# Patient Record
Sex: MALE | Race: WHITE | NOT HISPANIC OR LATINO
[De-identification: names, ages, dates, MRNs, and addresses within clinical notes are randomized per-mention and may not be internally consistent; named-entity substitution may affect disease eponyms.]

---

## 2017-01-10 ENCOUNTER — APPOINTMENT (OUTPATIENT)
Dept: PULMONOLOGY | Facility: CLINIC | Age: 52
End: 2017-01-10

## 2017-01-10 VITALS — BODY MASS INDEX: 29.6 KG/M2 | WEIGHT: 189 LBS

## 2017-01-10 VITALS — HEART RATE: 80 BPM | DIASTOLIC BLOOD PRESSURE: 70 MMHG | OXYGEN SATURATION: 96 % | SYSTOLIC BLOOD PRESSURE: 130 MMHG

## 2017-01-10 DIAGNOSIS — J44.9 CHRONIC OBSTRUCTIVE PULMONARY DISEASE, UNSPECIFIED: ICD-10-CM

## 2017-01-10 DIAGNOSIS — G47.33 OBSTRUCTIVE SLEEP APNEA (ADULT) (PEDIATRIC): ICD-10-CM

## 2017-01-10 DIAGNOSIS — R04.2 HEMOPTYSIS: ICD-10-CM

## 2017-02-01 ENCOUNTER — RX RENEWAL (OUTPATIENT)
Age: 52
End: 2017-02-01

## 2017-02-28 ENCOUNTER — FORM ENCOUNTER (OUTPATIENT)
Age: 52
End: 2017-02-28

## 2017-03-01 ENCOUNTER — APPOINTMENT (OUTPATIENT)
Dept: CT IMAGING | Facility: CLINIC | Age: 52
End: 2017-03-01

## 2017-03-01 ENCOUNTER — OUTPATIENT (OUTPATIENT)
Dept: OUTPATIENT SERVICES | Facility: HOSPITAL | Age: 52
LOS: 1 days | End: 2017-03-01
Payer: COMMERCIAL

## 2017-03-01 DIAGNOSIS — R04.2 HEMOPTYSIS: ICD-10-CM

## 2017-03-01 PROCEDURE — 71250 CT THORAX DX C-: CPT

## 2017-03-01 PROCEDURE — 70490 CT SOFT TISSUE NECK W/O DYE: CPT

## 2017-05-10 ENCOUNTER — APPOINTMENT (OUTPATIENT)
Dept: PULMONOLOGY | Facility: CLINIC | Age: 52
End: 2017-05-10

## 2017-06-15 ENCOUNTER — APPOINTMENT (OUTPATIENT)
Dept: PULMONOLOGY | Facility: CLINIC | Age: 52
End: 2017-06-15

## 2017-07-24 ENCOUNTER — EMERGENCY (EMERGENCY)
Facility: HOSPITAL | Age: 52
LOS: 1 days | Discharge: HOME | End: 2017-07-24
Attending: EMERGENCY MEDICINE | Admitting: EMERGENCY MEDICINE
Payer: COMMERCIAL

## 2017-07-24 VITALS
WEIGHT: 179.9 LBS | HEIGHT: 68 IN | DIASTOLIC BLOOD PRESSURE: 81 MMHG | SYSTOLIC BLOOD PRESSURE: 145 MMHG | OXYGEN SATURATION: 96 % | RESPIRATION RATE: 20 BRPM | HEART RATE: 83 BPM | TEMPERATURE: 98 F

## 2017-07-24 PROCEDURE — 99283 EMERGENCY DEPT VISIT LOW MDM: CPT

## 2017-07-24 NOTE — ED STATDOCS - MEDICAL DECISION MAKING DETAILS
d/c with steroids and follow with neurologist. d/c with steroids and follow with neurologist.  Describes symptoms discretely in femoral cutaneous nerve distribution; will start steroids and f/u with neurologist

## 2017-07-24 NOTE — ED STATDOCS - MUSCULOSKELETAL, MLM
Pt has FROM of the legs. Pt was able to take off his pant leg comfortably. Spine is midline. No CVAT. 2+ dp pulses.

## 2017-07-24 NOTE — ED ADULT TRIAGE NOTE - CHIEF COMPLAINT QUOTE
Patient arrived to ED today with c/o left leg numbness from his hip to the top of his knee.  Patient states symptoms for the past couple of weeks.

## 2017-07-24 NOTE — ED STATDOCS - OBJECTIVE STATEMENT
51 y/o male smoker with hx of nephrostomy, DM, COPD (on albuterol) presents to ED c/o worsening non-radiating numbness to the upper thigh onset 2 weeks. Pt reports that initially he had numbness intermittently; he describes the numbness as pins and needles. [place] the beach, pt was standing and states that suddenly the upper half of his leg felt 'ice cold'; after some time pt walked around and he felt fine. Pt states no pain, no ecchymosis to the leg. No other numbness around the body. Denies weakness, or difficulty walking. Pt denies heavy lifting or recent fall. Denies back pain, abd pain, fever, chills, N/V/D, HA, weakness, numbness, cough, dysuria, hematuria. No further complaints at this time.

## 2017-07-24 NOTE — ED STATDOCS - NEUROLOGICAL, MLM
Strength is normal. Normal reflexes. Slightly diminished sensations right upper thigh. No gait abnormality. No weakness.

## 2017-08-01 ENCOUNTER — APPOINTMENT (OUTPATIENT)
Dept: RADIOLOGY | Facility: CLINIC | Age: 52
End: 2017-08-01
Payer: COMMERCIAL

## 2017-08-01 ENCOUNTER — OUTPATIENT (OUTPATIENT)
Dept: OUTPATIENT SERVICES | Facility: HOSPITAL | Age: 52
LOS: 1 days | End: 2017-08-01
Payer: COMMERCIAL

## 2017-08-01 DIAGNOSIS — Z00.8 ENCOUNTER FOR OTHER GENERAL EXAMINATION: ICD-10-CM

## 2017-08-01 PROCEDURE — 72110 X-RAY EXAM L-2 SPINE 4/>VWS: CPT | Mod: 26

## 2017-08-01 PROCEDURE — 72110 X-RAY EXAM L-2 SPINE 4/>VWS: CPT

## 2017-08-01 PROCEDURE — 73502 X-RAY EXAM HIP UNI 2-3 VIEWS: CPT | Mod: 26,LT

## 2017-08-01 PROCEDURE — 73502 X-RAY EXAM HIP UNI 2-3 VIEWS: CPT

## 2017-08-07 ENCOUNTER — RX RENEWAL (OUTPATIENT)
Age: 52
End: 2017-08-07

## 2017-08-16 ENCOUNTER — APPOINTMENT (OUTPATIENT)
Dept: MRI IMAGING | Facility: CLINIC | Age: 52
End: 2017-08-16

## 2017-08-18 ENCOUNTER — APPOINTMENT (OUTPATIENT)
Dept: MRI IMAGING | Facility: CLINIC | Age: 52
End: 2017-08-18
Payer: COMMERCIAL

## 2017-08-18 ENCOUNTER — OUTPATIENT (OUTPATIENT)
Dept: OUTPATIENT SERVICES | Facility: HOSPITAL | Age: 52
LOS: 1 days | End: 2017-08-18
Payer: COMMERCIAL

## 2017-08-18 DIAGNOSIS — Z00.8 ENCOUNTER FOR OTHER GENERAL EXAMINATION: ICD-10-CM

## 2017-08-18 PROCEDURE — 72148 MRI LUMBAR SPINE W/O DYE: CPT

## 2017-08-18 PROCEDURE — 72148 MRI LUMBAR SPINE W/O DYE: CPT | Mod: 26

## 2017-09-11 ENCOUNTER — APPOINTMENT (OUTPATIENT)
Dept: PULMONOLOGY | Facility: CLINIC | Age: 52
End: 2017-09-11

## 2019-01-10 ENCOUNTER — OTHER (OUTPATIENT)
Age: 54
End: 2019-01-10

## 2019-01-10 ENCOUNTER — APPOINTMENT (OUTPATIENT)
Dept: GASTROENTEROLOGY | Facility: CLINIC | Age: 54
End: 2019-01-10

## 2019-01-14 ENCOUNTER — APPOINTMENT (OUTPATIENT)
Dept: GASTROENTEROLOGY | Facility: CLINIC | Age: 54
End: 2019-01-14
Payer: COMMERCIAL

## 2019-01-14 VITALS
HEART RATE: 75 BPM | WEIGHT: 168 LBS | DIASTOLIC BLOOD PRESSURE: 80 MMHG | OXYGEN SATURATION: 96 % | TEMPERATURE: 98.4 F | BODY MASS INDEX: 26.37 KG/M2 | HEIGHT: 67 IN | SYSTOLIC BLOOD PRESSURE: 120 MMHG

## 2019-01-14 DIAGNOSIS — E11.9 TYPE 2 DIABETES MELLITUS W/OUT COMPLICATIONS: ICD-10-CM

## 2019-01-14 DIAGNOSIS — Z87.09 PERSONAL HISTORY OF OTHER DISEASES OF THE RESPIRATORY SYSTEM: ICD-10-CM

## 2019-01-14 DIAGNOSIS — R10.12 LEFT UPPER QUADRANT PAIN: ICD-10-CM

## 2019-01-14 DIAGNOSIS — R19.7 DIARRHEA, UNSPECIFIED: ICD-10-CM

## 2019-01-14 DIAGNOSIS — R10.33 PERIUMBILICAL PAIN: ICD-10-CM

## 2019-01-14 PROCEDURE — 99203 OFFICE O/P NEW LOW 30 MIN: CPT

## 2019-01-14 RX ORDER — ATORVASTATIN CALCIUM 10 MG/1
10 TABLET, FILM COATED ORAL
Refills: 0 | Status: ACTIVE | COMMUNITY

## 2019-01-14 RX ORDER — EMPAGLIFLOZIN 10 MG/1
10 TABLET, FILM COATED ORAL
Refills: 0 | Status: ACTIVE | COMMUNITY

## 2019-01-14 RX ORDER — MONTELUKAST 10 MG/1
10 TABLET, FILM COATED ORAL
Refills: 0 | Status: ACTIVE | COMMUNITY

## 2019-01-14 NOTE — REASON FOR VISIT
[Initial Evaluation] : an initial evaluation [FreeTextEntry1] : H. abdominal pain and loose bowel movements, with an elevated lipase

## 2019-01-14 NOTE — HISTORY OF PRESENT ILLNESS
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 The patient had a period of mostly left upper quadrant discomfort with diarrhea or that was nonbloody. He had bloodwork showing an elevated lipase. The pain has much subsided. He is minimal discomfort intermittently in the left upper quadrant. He is currently well. Diarrhea has resolved. He said blood showing elevated lipase as far as he knows up to 500 on most recent labs. He had ultrasound of the abdomen that was normal. No gallstones reported. And he had a noncontrast CAT scan of the abdomen and pelvis that is normal. He did not have contrast because he had a nephrectomy many years ago apparently because of a underdeveloped or abnormal kidney. As far as he knows he has normal renal function. It no fever or chills. No family history of liver or biliary abnormalities. No family history of pancreatitis. No family history of peptic ulcer disease or intestinal malignancies. He is not a colonoscopy.

## 2019-01-14 NOTE — CONSULT LETTER
[Dear  ___] : Dear  [unfilled], [Consult Letter:] : I had the pleasure of evaluating your patient, [unfilled]. [Please see my note below.] : Please see my note below. [Consult Closing:] : Thank you very much for allowing me to participate in the care of this patient.  If you have any questions, please do not hesitate to contact me. [Sincerely,] : Sincerely, [FreeTextEntry2] : Markie Hoyos MD\par [address] [facility]\[place] [address]\par  [FreeTextEntry3] : Gurwinder Varner MD\par

## 2019-01-14 NOTE — PHYSICAL EXAM
[General Appearance - Alert] : alert [General Appearance - In No Acute Distress] : in no acute distress [FreeTextEntry1] : Heavyset pleasant gentleman, in no acute distress [Sclera] : the sclera and conjunctiva were normal [Neck Appearance] : the appearance of the neck was normal [Neck Cervical Mass (___cm)] : no neck mass was observed [Jugular Venous Distention Increased] : there was no jugular-venous distention [Respiration, Rhythm And Depth] : normal respiratory rhythm and effort [Exaggerated Use Of Accessory Muscles For Inspiration] : no accessory muscle use [Auscultation Breath Sounds / Voice Sounds] : lungs were clear to auscultation bilaterally [Apical Impulse] : the apical impulse was normal [Heart Rate And Rhythm] : heart rate was normal and rhythm regular [Full Pulse] : the pedal pulses are present [Edema] : there was no peripheral edema [Bowel Sounds] : normal bowel sounds [Abdomen Soft] : soft [Abdomen Tenderness] : non-tender [] : no hepato-splenomegaly [Abdomen Mass (___ Cm)] : no abdominal mass palpated [Cervical Lymph Nodes Enlarged Posterior Bilaterally] : posterior cervical [Cervical Lymph Nodes Enlarged Anterior Bilaterally] : anterior cervical [Supraclavicular Lymph Nodes Enlarged Bilaterally] : supraclavicular [Axillary Lymph Nodes Enlarged Bilaterally] : axillary [Femoral Lymph Nodes Enlarged Bilaterally] : femoral [Inguinal Lymph Nodes Enlarged Bilaterally] : inguinal [No CVA Tenderness] : no ~M costovertebral angle tenderness [No Spinal Tenderness] : no spinal tenderness [No Focal Deficits] : no focal deficits [Oriented To Time, Place, And Person] : oriented to person, place, and time [Impaired Insight] : insight and judgment were intact [Affect] : the affect was normal

## 2019-01-14 NOTE — ASSESSMENT
[FreeTextEntry1] : Impression\par \par Unexplained elevated lipase as high as 500\par \par Transient abdominal discomfort, essentially gone\par \par Transient diarrhea gone\par \par History of nephrectomy, no reported renal insufficiency\par \par Suggest\par \par Blood work here today to include CBC, CMP, amylase and lipase\par \par MRI with MRCP of the abdomen with contrast, as well as renal function is essentially normal\par \par Upper endoscopy and colonoscopy\par \par Call or return anytime as needed\par \par Go to emergency room if needed\par \par Low fat diet for now\par \par Risks/benefits:\par The procedure, the risks and benefits and alternatives have been reviewed in great detail with the patient.  Risks including, but not limited to sedation such as cardiac and pulmonary compromise, the procedure itself such as bleeding requiring hospitalization, transfusion, surgery, temporary or permanent colostomy.  Perforation or puncture of the requiring hospitalization, surgery, temporary colostomy.\par It has been explained to the patient that though colonoscopy is thought to be the best screening exam for colon cancer and polyps, no screening exam can find all colon polyps or cancers.  \par The patient expresses understanding of the procedure and consents to undergoing the procedure.\par \par

## 2019-01-15 ENCOUNTER — OTHER (OUTPATIENT)
Age: 54
End: 2019-01-15

## 2019-01-15 LAB
ALBUMIN SERPL ELPH-MCNC: 4.5 G/DL
ALP BLD-CCNC: 84 U/L
ALT SERPL-CCNC: 21 U/L
AMYLASE/CREAT SERPL: 247 U/L
ANION GAP SERPL CALC-SCNC: 11 MMOL/L
AST SERPL-CCNC: 12 U/L
BASOPHILS # BLD AUTO: 0.07 K/UL
BASOPHILS NFR BLD AUTO: 0.8 %
BILIRUB SERPL-MCNC: 0.4 MG/DL
BUN SERPL-MCNC: 17 MG/DL
CALCIUM SERPL-MCNC: 10 MG/DL
CANCER AG19-9 SERPL-ACNC: 30.7 U/ML
CEA SERPL-MCNC: 3.6 NG/ML
CHLORIDE SERPL-SCNC: 100 MMOL/L
CO2 SERPL-SCNC: 28 MMOL/L
CREAT SERPL-MCNC: 0.91 MG/DL
EOSINOPHIL # BLD AUTO: 0.34 K/UL
EOSINOPHIL NFR BLD AUTO: 3.8 %
GLUCOSE SERPL-MCNC: 123 MG/DL
HCT VFR BLD CALC: 52.2 %
HGB BLD-MCNC: 17.1 G/DL
IMM GRANULOCYTES NFR BLD AUTO: 0.2 %
LPL SERPL-CCNC: 382 U/L
LYMPHOCYTES # BLD AUTO: 3.73 K/UL
LYMPHOCYTES NFR BLD AUTO: 41.9 %
MAN DIFF?: NORMAL
MCHC RBC-ENTMCNC: 29.9 PG
MCHC RBC-ENTMCNC: 32.8 GM/DL
MCV RBC AUTO: 91.3 FL
MONOCYTES # BLD AUTO: 0.75 K/UL
MONOCYTES NFR BLD AUTO: 8.4 %
NEUTROPHILS # BLD AUTO: 4 K/UL
NEUTROPHILS NFR BLD AUTO: 44.9 %
PLATELET # BLD AUTO: 203 K/UL
POTASSIUM SERPL-SCNC: 4.7 MMOL/L
PROT SERPL-MCNC: 7.3 G/DL
RBC # BLD: 5.72 M/UL
RBC # FLD: 13.6 %
SODIUM SERPL-SCNC: 139 MMOL/L
WBC # FLD AUTO: 8.91 K/UL

## 2019-01-16 ENCOUNTER — APPOINTMENT (OUTPATIENT)
Dept: GASTROENTEROLOGY | Facility: CLINIC | Age: 54
End: 2019-01-16

## 2019-01-22 ENCOUNTER — APPOINTMENT (OUTPATIENT)
Dept: MRI IMAGING | Facility: CLINIC | Age: 54
End: 2019-01-22

## 2019-01-25 ENCOUNTER — OTHER (OUTPATIENT)
Age: 54
End: 2019-01-25

## 2019-02-01 ENCOUNTER — OTHER (OUTPATIENT)
Age: 54
End: 2019-02-01

## 2019-02-01 ENCOUNTER — APPOINTMENT (OUTPATIENT)
Dept: GASTROENTEROLOGY | Facility: CLINIC | Age: 54
End: 2019-02-01

## 2019-02-19 ENCOUNTER — OTHER (OUTPATIENT)
Age: 54
End: 2019-02-19

## 2019-02-21 ENCOUNTER — OTHER (OUTPATIENT)
Age: 54
End: 2019-02-21

## 2019-02-21 ENCOUNTER — APPOINTMENT (OUTPATIENT)
Dept: GASTROENTEROLOGY | Facility: AMBULATORY MEDICAL SERVICES | Age: 54
End: 2019-02-21
Payer: COMMERCIAL

## 2019-02-21 DIAGNOSIS — K29.00 ACUTE GASTRITIS W/OUT BLEEDING: ICD-10-CM

## 2019-02-21 PROCEDURE — 45380 COLONOSCOPY AND BIOPSY: CPT

## 2019-02-21 PROCEDURE — 43239 EGD BIOPSY SINGLE/MULTIPLE: CPT

## 2019-02-27 ENCOUNTER — APPOINTMENT (OUTPATIENT)
Dept: GASTROENTEROLOGY | Facility: CLINIC | Age: 54
End: 2019-02-27

## 2019-02-28 ENCOUNTER — TRANSCRIPTION ENCOUNTER (OUTPATIENT)
Age: 54
End: 2019-02-28

## 2019-03-01 ENCOUNTER — OTHER (OUTPATIENT)
Age: 54
End: 2019-03-01

## 2019-03-07 ENCOUNTER — APPOINTMENT (OUTPATIENT)
Dept: GASTROENTEROLOGY | Facility: HOSPITAL | Age: 54
End: 2019-03-07

## 2019-03-11 ENCOUNTER — MOBILE ON CALL (OUTPATIENT)
Age: 54
End: 2019-03-11

## 2019-03-11 ENCOUNTER — OTHER (OUTPATIENT)
Age: 54
End: 2019-03-11

## 2019-05-20 ENCOUNTER — OTHER (OUTPATIENT)
Age: 54
End: 2019-05-20

## 2019-06-17 ENCOUNTER — APPOINTMENT (OUTPATIENT)
Dept: GASTROENTEROLOGY | Facility: CLINIC | Age: 54
End: 2019-06-17
Payer: COMMERCIAL

## 2019-06-17 VITALS
TEMPERATURE: 98.3 F | BODY MASS INDEX: 27.28 KG/M2 | OXYGEN SATURATION: 96 % | HEIGHT: 67 IN | HEART RATE: 81 BPM | DIASTOLIC BLOOD PRESSURE: 90 MMHG | WEIGHT: 173.8 LBS | SYSTOLIC BLOOD PRESSURE: 130 MMHG

## 2019-06-17 DIAGNOSIS — R74.8 ABNORMAL LEVELS OF OTHER SERUM ENZYMES: ICD-10-CM

## 2019-06-17 PROCEDURE — 99214 OFFICE O/P EST MOD 30 MIN: CPT

## 2019-06-17 NOTE — CONSULT LETTER
[Dear  ___] : Dear  [unfilled], [Consult Letter:] : I had the pleasure of evaluating your patient, [unfilled]. [Consult Closing:] : Thank you very much for allowing me to participate in the care of this patient.  If you have any questions, please do not hesitate to contact me. [Please see my note below.] : Please see my note below. [Sincerely,] : Sincerely, [FreeTextEntry2] : Markie Hoyos MD\par [address] [facility]\[place] [address]\par  [FreeTextEntry3] : Gurwinder Varner MD\par

## 2019-06-17 NOTE — HISTORY OF PRESENT ILLNESS
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

## 2019-06-17 NOTE — ASSESSMENT
[FreeTextEntry1] : Impression\par \par Unexplained elevated lipase\par \par Weight loss\par \par Abdominal discomfort\par \par Loose bowel movements intermittently\par \par Fairly extensive workup so far negative\par \par Endoscopic ultrasound has not been done\par \par Suggest\par \par I think it would be reasonable for the patient to have endoscopic ultrasound to further look at the pancreas and biliary system\par \par I have referred him to one of my colleagues with this procedure\par \par He will follow up with me afterwards\par \par He may call or return anytime sooner as needed

## 2019-06-17 NOTE — PHYSICAL EXAM
[General Appearance - Alert] : alert [General Appearance - In No Acute Distress] : in no acute distress [FreeTextEntry1] : Heavyset pleasant gentleman, in no acute distress [Sclera] : the sclera and conjunctiva were normal [Neck Appearance] : the appearance of the neck was normal [Jugular Venous Distention Increased] : there was no jugular-venous distention [Neck Cervical Mass (___cm)] : no neck mass was observed [Respiration, Rhythm And Depth] : normal respiratory rhythm and effort [Auscultation Breath Sounds / Voice Sounds] : lungs were clear to auscultation bilaterally [Exaggerated Use Of Accessory Muscles For Inspiration] : no accessory muscle use [Heart Rate And Rhythm] : heart rate was normal and rhythm regular [Apical Impulse] : the apical impulse was normal [Edema] : there was no peripheral edema [Full Pulse] : the pedal pulses are present [Bowel Sounds] : normal bowel sounds [Abdomen Tenderness] : non-tender [Abdomen Soft] : soft [Abdomen Mass (___ Cm)] : no abdominal mass palpated [] : no hepato-splenomegaly [Cervical Lymph Nodes Enlarged Posterior Bilaterally] : posterior cervical [Supraclavicular Lymph Nodes Enlarged Bilaterally] : supraclavicular [Cervical Lymph Nodes Enlarged Anterior Bilaterally] : anterior cervical [Femoral Lymph Nodes Enlarged Bilaterally] : femoral [Axillary Lymph Nodes Enlarged Bilaterally] : axillary [No CVA Tenderness] : no ~M costovertebral angle tenderness [Inguinal Lymph Nodes Enlarged Bilaterally] : inguinal [No Spinal Tenderness] : no spinal tenderness [No Focal Deficits] : no focal deficits [Oriented To Time, Place, And Person] : oriented to person, place, and time [Affect] : the affect was normal [Impaired Insight] : insight and judgment were intact

## 2019-06-17 NOTE — REASON FOR VISIT
[FreeTextEntry1] : Persistent elevated lipase, abdominal discomfort and loose bowel movements and weight loss that time

## 2019-06-21 ENCOUNTER — OUTPATIENT (OUTPATIENT)
Dept: OUTPATIENT SERVICES | Facility: HOSPITAL | Age: 54
LOS: 1 days | End: 2019-06-21
Payer: COMMERCIAL

## 2019-06-21 VITALS
WEIGHT: 173.06 LBS | RESPIRATION RATE: 14 BRPM | HEIGHT: 67 IN | SYSTOLIC BLOOD PRESSURE: 140 MMHG | OXYGEN SATURATION: 95 % | DIASTOLIC BLOOD PRESSURE: 88 MMHG | TEMPERATURE: 98 F | HEART RATE: 70 BPM

## 2019-06-21 DIAGNOSIS — K40.90 UNILATERAL INGUINAL HERNIA, WITHOUT OBSTRUCTION OR GANGRENE, NOT SPECIFIED AS RECURRENT: Chronic | ICD-10-CM

## 2019-06-21 DIAGNOSIS — R10.9 UNSPECIFIED ABDOMINAL PAIN: ICD-10-CM

## 2019-06-21 DIAGNOSIS — Z90.5 ACQUIRED ABSENCE OF KIDNEY: Chronic | ICD-10-CM

## 2019-06-21 DIAGNOSIS — Z98.890 OTHER SPECIFIED POSTPROCEDURAL STATES: Chronic | ICD-10-CM

## 2019-06-21 LAB
ALBUMIN SERPL ELPH-MCNC: 4.4 G/DL — SIGNIFICANT CHANGE UP (ref 3.3–5)
ALP SERPL-CCNC: 100 U/L — SIGNIFICANT CHANGE UP (ref 40–120)
ALT FLD-CCNC: 14 U/L — SIGNIFICANT CHANGE UP (ref 4–41)
ANION GAP SERPL CALC-SCNC: 11 MMO/L — SIGNIFICANT CHANGE UP (ref 7–14)
AST SERPL-CCNC: 9 U/L — SIGNIFICANT CHANGE UP (ref 4–40)
BILIRUB DIRECT SERPL-MCNC: < 0.2 MG/DL — SIGNIFICANT CHANGE UP (ref 0.1–0.2)
BILIRUB SERPL-MCNC: 0.5 MG/DL — SIGNIFICANT CHANGE UP (ref 0.2–1.2)
BUN SERPL-MCNC: 21 MG/DL — SIGNIFICANT CHANGE UP (ref 7–23)
CALCIUM SERPL-MCNC: 10.5 MG/DL — SIGNIFICANT CHANGE UP (ref 8.4–10.5)
CHLORIDE SERPL-SCNC: 97 MMOL/L — LOW (ref 98–107)
CO2 SERPL-SCNC: 30 MMOL/L — SIGNIFICANT CHANGE UP (ref 22–31)
CREAT SERPL-MCNC: 1 MG/DL — SIGNIFICANT CHANGE UP (ref 0.5–1.3)
GLUCOSE SERPL-MCNC: 172 MG/DL — HIGH (ref 70–99)
HBA1C BLD-MCNC: 7.8 % — HIGH (ref 4–5.6)
HCT VFR BLD CALC: 56.2 % — HIGH (ref 39–50)
HGB BLD-MCNC: 18.1 G/DL — HIGH (ref 13–17)
MCHC RBC-ENTMCNC: 29.3 PG — SIGNIFICANT CHANGE UP (ref 27–34)
MCHC RBC-ENTMCNC: 32.2 % — SIGNIFICANT CHANGE UP (ref 32–36)
MCV RBC AUTO: 91.1 FL — SIGNIFICANT CHANGE UP (ref 80–100)
NRBC # FLD: 0 K/UL — SIGNIFICANT CHANGE UP (ref 0–0)
PLATELET # BLD AUTO: 212 K/UL — SIGNIFICANT CHANGE UP (ref 150–400)
PMV BLD: 10.5 FL — SIGNIFICANT CHANGE UP (ref 7–13)
POTASSIUM SERPL-MCNC: 4.8 MMOL/L — SIGNIFICANT CHANGE UP (ref 3.5–5.3)
POTASSIUM SERPL-SCNC: 4.8 MMOL/L — SIGNIFICANT CHANGE UP (ref 3.5–5.3)
PROT SERPL-MCNC: 7.4 G/DL — SIGNIFICANT CHANGE UP (ref 6–8.3)
RBC # BLD: 6.17 M/UL — HIGH (ref 4.2–5.8)
RBC # FLD: 14.1 % — SIGNIFICANT CHANGE UP (ref 10.3–14.5)
SODIUM SERPL-SCNC: 138 MMOL/L — SIGNIFICANT CHANGE UP (ref 135–145)
WBC # BLD: 9.94 K/UL — SIGNIFICANT CHANGE UP (ref 3.8–10.5)
WBC # FLD AUTO: 9.94 K/UL — SIGNIFICANT CHANGE UP (ref 3.8–10.5)

## 2019-06-21 PROCEDURE — 93010 ELECTROCARDIOGRAM REPORT: CPT

## 2019-06-21 NOTE — H&P PST ADULT - HISTORY OF PRESENT ILLNESS
55y/o male scheduled for upper endoscopic US anesthesia on 6/26/2019.  Pt states, " approx 6 months ago developed abdominal pain last episode 4 months ago, not associated with food.  S/p MRI, cat scan US, endoscopy, colonoscopy all negative.  Occasional elevation in lipase.  Now having further evaluation of the pancreas."

## 2019-06-21 NOTE — H&P PST ADULT - NEGATIVE GENERAL GENITOURINARY SYMPTOMS
no hematuria/no bladder infections/normal urinary frequency/no flank pain L/no flank pain R/no dysuria/no urinary hesitancy

## 2019-06-21 NOTE — H&P PST ADULT - NSICDXPASTSURGICALHX_GEN_ALL_CORE_FT
PAST SURGICAL HISTORY:  Inguinal hernia left    S/P arthroscopy left, torn meniscus 25yrs ago    S/p nephrectomy left, congenital defect  9y/o PAST SURGICAL HISTORY:  Inguinal hernia left    S/P arthroscopy left, torn meniscus 25yrs ago    S/p nephrectomy left, congenital defect  7y/o

## 2019-06-21 NOTE — H&P PST ADULT - NSICDXPROBLEM_GEN_ALL_CORE_FT
PROBLEM DIAGNOSES  Problem: Abdominal pain  Assessment and Plan: Pt scheduled for upper endoscopic US anesthesia on 6/26/2019.  labs done results pending, ekg done.  Preop teaching done, pt able to verbalize understanding.     OR booking notified of sleep apnea    Meds day of procedure:  trilogy ellipta, montelukast, ranitidine, albuterol

## 2019-06-21 NOTE — H&P PST ADULT - RS GEN PE MLT RESP DETAILS PC
no chest wall tenderness/no intercostal retractions/no rales/no subcutaneous emphysema/no wheezes/clear to auscultation bilaterally/good air movement/respirations non-labored/no rhonchi/breath sounds equal

## 2019-06-21 NOTE — H&P PST ADULT - NSICDXPASTMEDICALHX_GEN_ALL_CORE_FT
PAST MEDICAL HISTORY:  Abdominal pain     Asthma     Diabetes mellitus type 2    GERD (gastroesophageal reflux disease)     Hyperlipidemia PAST MEDICAL HISTORY:  Abdominal pain     Asthma     Diabetes mellitus type 2    GERD (gastroesophageal reflux disease)     Hyperlipidemia     Sleep apnea

## 2019-06-21 NOTE — H&P PST ADULT - NEGATIVE GENERAL SYMPTOMS
no malaise/no chills/no sweating/no weight loss/no polyphagia/no fatigue/no weight gain/no polyuria/no polydipsia/no fever/no anorexia

## 2019-06-21 NOTE — H&P PST ADULT - GASTROINTESTINAL COMMENTS
last episode of abdominal pain 4 months ago last episode of abdominal pain 4 months ago, not associated with food, elevated lipase levels

## 2019-06-21 NOTE — H&P PST ADULT - GASTROINTESTINAL DETAILS
nontender/bowel sounds normal/no bruit/soft/no guarding/no distention/no masses palpable/no rebound tenderness/no rigidity/no organomegaly

## 2019-06-21 NOTE — H&P PST ADULT - NEGATIVE CARDIOVASCULAR SYMPTOMS
no orthopnea/no chest pain/no dyspnea on exertion/no claudication/no palpitations/no paroxysmal nocturnal dyspnea/no peripheral edema

## 2019-06-26 ENCOUNTER — APPOINTMENT (OUTPATIENT)
Dept: GASTROENTEROLOGY | Facility: HOSPITAL | Age: 54
End: 2019-06-26

## 2019-07-29 ENCOUNTER — OTHER (OUTPATIENT)
Age: 54
End: 2019-07-29

## 2019-07-29 RX ORDER — RANITIDINE 300 MG/1
300 TABLET ORAL
Qty: 30 | Refills: 2 | Status: ACTIVE | COMMUNITY
Start: 2019-02-21 | End: 1900-01-01

## 2019-08-20 PROBLEM — E11.9 TYPE 2 DIABETES MELLITUS WITHOUT COMPLICATIONS: Chronic | Status: ACTIVE | Noted: 2019-06-21

## 2019-08-20 PROBLEM — E78.5 HYPERLIPIDEMIA, UNSPECIFIED: Chronic | Status: ACTIVE | Noted: 2019-06-21

## 2019-08-20 PROBLEM — K21.9 GASTRO-ESOPHAGEAL REFLUX DISEASE WITHOUT ESOPHAGITIS: Chronic | Status: ACTIVE | Noted: 2019-06-21

## 2019-08-20 PROBLEM — J45.909 UNSPECIFIED ASTHMA, UNCOMPLICATED: Chronic | Status: ACTIVE | Noted: 2019-06-21

## 2019-09-03 ENCOUNTER — OUTPATIENT (OUTPATIENT)
Dept: OUTPATIENT SERVICES | Facility: HOSPITAL | Age: 54
LOS: 1 days | End: 2019-09-03
Payer: COMMERCIAL

## 2019-09-03 VITALS
WEIGHT: 169.98 LBS | DIASTOLIC BLOOD PRESSURE: 82 MMHG | RESPIRATION RATE: 16 BRPM | SYSTOLIC BLOOD PRESSURE: 160 MMHG | HEART RATE: 80 BPM | OXYGEN SATURATION: 95 % | HEIGHT: 66 IN | TEMPERATURE: 98 F

## 2019-09-03 DIAGNOSIS — J45.909 UNSPECIFIED ASTHMA, UNCOMPLICATED: ICD-10-CM

## 2019-09-03 DIAGNOSIS — K40.90 UNILATERAL INGUINAL HERNIA, WITHOUT OBSTRUCTION OR GANGRENE, NOT SPECIFIED AS RECURRENT: Chronic | ICD-10-CM

## 2019-09-03 DIAGNOSIS — Z90.5 ACQUIRED ABSENCE OF KIDNEY: Chronic | ICD-10-CM

## 2019-09-03 DIAGNOSIS — Z98.890 OTHER SPECIFIED POSTPROCEDURAL STATES: Chronic | ICD-10-CM

## 2019-09-03 DIAGNOSIS — G47.33 OBSTRUCTIVE SLEEP APNEA (ADULT) (PEDIATRIC): ICD-10-CM

## 2019-09-03 DIAGNOSIS — R10.9 UNSPECIFIED ABDOMINAL PAIN: ICD-10-CM

## 2019-09-03 DIAGNOSIS — Z01.818 ENCOUNTER FOR OTHER PREPROCEDURAL EXAMINATION: ICD-10-CM

## 2019-09-03 DIAGNOSIS — R74.8 ABNORMAL LEVELS OF OTHER SERUM ENZYMES: ICD-10-CM

## 2019-09-03 DIAGNOSIS — E11.9 TYPE 2 DIABETES MELLITUS WITHOUT COMPLICATIONS: ICD-10-CM

## 2019-09-03 LAB
ANION GAP SERPL CALC-SCNC: 13 MMOL/L — SIGNIFICANT CHANGE UP (ref 5–17)
BUN SERPL-MCNC: 20 MG/DL — SIGNIFICANT CHANGE UP (ref 7–23)
CALCIUM SERPL-MCNC: 10.1 MG/DL — SIGNIFICANT CHANGE UP (ref 8.4–10.5)
CHLORIDE SERPL-SCNC: 99 MMOL/L — SIGNIFICANT CHANGE UP (ref 96–108)
CO2 SERPL-SCNC: 27 MMOL/L — SIGNIFICANT CHANGE UP (ref 22–31)
CREAT SERPL-MCNC: 0.96 MG/DL — SIGNIFICANT CHANGE UP (ref 0.5–1.3)
GLUCOSE SERPL-MCNC: 194 MG/DL — HIGH (ref 70–99)
HCT VFR BLD CALC: 54.7 % — HIGH (ref 39–50)
HGB BLD-MCNC: 17.6 G/DL — HIGH (ref 13–17)
MCHC RBC-ENTMCNC: 29.4 PG — SIGNIFICANT CHANGE UP (ref 27–34)
MCHC RBC-ENTMCNC: 32.2 GM/DL — SIGNIFICANT CHANGE UP (ref 32–36)
MCV RBC AUTO: 91.5 FL — SIGNIFICANT CHANGE UP (ref 80–100)
PLATELET # BLD AUTO: 215 K/UL — SIGNIFICANT CHANGE UP (ref 150–400)
POTASSIUM SERPL-MCNC: 4.5 MMOL/L — SIGNIFICANT CHANGE UP (ref 3.5–5.3)
POTASSIUM SERPL-SCNC: 4.5 MMOL/L — SIGNIFICANT CHANGE UP (ref 3.5–5.3)
RBC # BLD: 5.98 M/UL — HIGH (ref 4.2–5.8)
RBC # FLD: 14 % — SIGNIFICANT CHANGE UP (ref 10.3–14.5)
SODIUM SERPL-SCNC: 139 MMOL/L — SIGNIFICANT CHANGE UP (ref 135–145)
WBC # BLD: 11.25 K/UL — HIGH (ref 3.8–10.5)
WBC # FLD AUTO: 11.25 K/UL — HIGH (ref 3.8–10.5)

## 2019-09-03 PROCEDURE — 85027 COMPLETE CBC AUTOMATED: CPT

## 2019-09-03 PROCEDURE — 80048 BASIC METABOLIC PNL TOTAL CA: CPT

## 2019-09-03 PROCEDURE — G0463: CPT

## 2019-09-03 PROCEDURE — 83036 HEMOGLOBIN GLYCOSYLATED A1C: CPT

## 2019-09-03 NOTE — H&P PST ADULT - NEGATIVE GENERAL SYMPTOMS
no sweating/no anorexia/no polydipsia/no polyphagia/no polyuria/no fever/no chills/no malaise/no fatigue

## 2019-09-03 NOTE — H&P PST ADULT - NSICDXPASTSURGICALHX_GEN_ALL_CORE_FT
PAST SURGICAL HISTORY:  Inguinal hernia left    S/P arthroscopy left, torn meniscus 25yrs ago    S/p nephrectomy left, congenital defect  7y/o PAST SURGICAL HISTORY:  H/O colonoscopy 6/2019    History of endoscopy 6/2019    Inguinal hernia left    S/P arthroscopy left, torn meniscus 25yrs ago    S/p nephrectomy left, congenital defect  9y/o

## 2019-09-03 NOTE — H&P PST ADULT - NSICDXPROBLEM_GEN_ALL_CORE_FT
PROBLEM DIAGNOSES  Problem: Abdominal pain  Assessment and Plan: Upper EUS ANES  Pre-op education provided - all questions answered     Problem: Asthma  Assessment and Plan: continue on current medical regimen    Problem: T2DM (type 2 diabetes mellitus)  Assessment and Plan: Finger stick on day of surgery   Hold Jardiance 3 days before surgery  Hold Metformin 24 hrs before surgery    Problem: JESUS (obstructive sleep apnea)  Assessment and Plan: JESUS precautions, OR booking notified

## 2019-09-03 NOTE — H&P PST ADULT - HISTORY OF PRESENT ILLNESS
53y/o male with H/O T2SM, GERD, JESUS on CPAP, Asthma, presents to PST for scheduled upper endoscopic US anesthesia on 9/6/2019.  Pt states, " approx 8 months ago developed abdominal pain last episode 6 months ago, not associated with food.  S/p MRI, cat scan US, endoscopy, colonoscopy all negative.  Occasional elevation in lipase.  Now having further evaluation of the pancreas."

## 2019-09-03 NOTE — H&P PST ADULT - NSICDXPASTMEDICALHX_GEN_ALL_CORE_FT
PAST MEDICAL HISTORY:  Abdominal pain     Asthma     Current smoker     Diabetes mellitus type 2    GERD (gastroesophageal reflux disease)     Hyperlipidemia     Sleep apnea CPAP

## 2019-09-03 NOTE — H&P PST ADULT - RS GEN PE MLT RESP DETAILS PC
good air movement/respirations non-labored/no rhonchi/no rales/no wheezes/breath sounds equal/clear to auscultation bilaterally

## 2019-09-03 NOTE — H&P PST ADULT - NEUROLOGICAL DETAILS
no spontaneous movement/alert and oriented x 3/sensation intact/normal strength/responds to verbal commands

## 2019-09-03 NOTE — H&P PST ADULT - GASTROINTESTINAL DETAILS
nontender/no distention/bowel sounds normal/no masses palpable/no guarding/no rigidity/no organomegaly/no rebound tenderness/soft

## 2019-09-04 LAB — HBA1C BLD-MCNC: 7.9 % — HIGH (ref 4–5.6)

## 2019-09-06 ENCOUNTER — RESULT REVIEW (OUTPATIENT)
Age: 54
End: 2019-09-06

## 2019-09-06 ENCOUNTER — OUTPATIENT (OUTPATIENT)
Dept: OUTPATIENT SERVICES | Facility: HOSPITAL | Age: 54
LOS: 1 days | End: 2019-09-06
Payer: COMMERCIAL

## 2019-09-06 ENCOUNTER — APPOINTMENT (OUTPATIENT)
Dept: GASTROENTEROLOGY | Facility: HOSPITAL | Age: 54
End: 2019-09-06

## 2019-09-06 DIAGNOSIS — R74.8 ABNORMAL LEVELS OF OTHER SERUM ENZYMES: ICD-10-CM

## 2019-09-06 DIAGNOSIS — Z98.890 OTHER SPECIFIED POSTPROCEDURAL STATES: Chronic | ICD-10-CM

## 2019-09-06 DIAGNOSIS — R10.9 UNSPECIFIED ABDOMINAL PAIN: ICD-10-CM

## 2019-09-06 DIAGNOSIS — K40.90 UNILATERAL INGUINAL HERNIA, WITHOUT OBSTRUCTION OR GANGRENE, NOT SPECIFIED AS RECURRENT: Chronic | ICD-10-CM

## 2019-09-06 DIAGNOSIS — Z90.5 ACQUIRED ABSENCE OF KIDNEY: Chronic | ICD-10-CM

## 2019-09-06 PROCEDURE — 88305 TISSUE EXAM BY PATHOLOGIST: CPT

## 2019-09-06 PROCEDURE — 88312 SPECIAL STAINS GROUP 1: CPT

## 2019-09-06 PROCEDURE — 88305 TISSUE EXAM BY PATHOLOGIST: CPT | Mod: 26

## 2019-09-06 PROCEDURE — 43259 EGD US EXAM DUODENUM/JEJUNUM: CPT | Mod: GC

## 2019-09-06 PROCEDURE — 43239 EGD BIOPSY SINGLE/MULTIPLE: CPT | Mod: GC,59

## 2019-09-06 PROCEDURE — 43239 EGD BIOPSY SINGLE/MULTIPLE: CPT

## 2019-09-06 PROCEDURE — 88312 SPECIAL STAINS GROUP 1: CPT | Mod: 26

## 2019-09-25 ENCOUNTER — OTHER (OUTPATIENT)
Age: 54
End: 2019-09-25

## 2019-09-25 ENCOUNTER — RX RENEWAL (OUTPATIENT)
Age: 54
End: 2019-09-25

## 2019-09-25 RX ORDER — FAMOTIDINE 40 MG/1
40 TABLET, FILM COATED ORAL DAILY
Qty: 90 | Refills: 1 | Status: ACTIVE | COMMUNITY
Start: 2019-09-25 | End: 1900-01-01

## 2019-10-17 ENCOUNTER — APPOINTMENT (OUTPATIENT)
Dept: GASTROENTEROLOGY | Facility: CLINIC | Age: 54
End: 2019-10-17
Payer: COMMERCIAL

## 2019-10-17 VITALS
DIASTOLIC BLOOD PRESSURE: 90 MMHG | TEMPERATURE: 97.8 F | HEART RATE: 84 BPM | HEIGHT: 67 IN | RESPIRATION RATE: 16 BRPM | OXYGEN SATURATION: 96 % | WEIGHT: 177 LBS | BODY MASS INDEX: 27.78 KG/M2 | SYSTOLIC BLOOD PRESSURE: 148 MMHG

## 2019-10-17 DIAGNOSIS — K85.90 ACUTE PANCREATITIS WITHOUT NECROSIS OR INFECTION, UNSPECIFIED: ICD-10-CM

## 2019-10-17 PROCEDURE — 99214 OFFICE O/P EST MOD 30 MIN: CPT

## 2019-10-17 PROCEDURE — 99203 OFFICE O/P NEW LOW 30 MIN: CPT

## 2019-10-27 NOTE — HISTORY OF PRESENT ILLNESS
[FreeTextEntry1] : 54 recently had EUS to better evaluate pancreas. He has had a few attacks of mild pancreatitis. There is no obvious discernible cause. The patient denies drinking excessive EtOH. EUS did not reveal any stones. He feels well now. No abdominal pain, nausea or vomiting. No back pain. Denies fever or chills. Denies steatorrhea. Patient is not overweight and weight and appetite are stable. Symptoms and episodes have started with diabetes medications but it is unclear which ones since he is taking metformin and jardiance.

## 2019-10-27 NOTE — ASSESSMENT
[FreeTextEntry1] : 54 with episodes of pain across abdomen which are associated with mild elevations in pancreatic enzymes\par Given distribution and symptoms he is having acute episodes of mild recurrent pancreatitis. MRI and EUS do not elucidate any obvious causes. he is a smoker and past drinker. There is no evidence of stones. The symptoms did start with initiation of metformin. It is feasible that the metformin is the issue and I will have him discuss changing this with his endocrinologist. \par Check IgG Abs today

## 2019-10-27 NOTE — PHYSICAL EXAM
[General Appearance - Alert] : alert [General Appearance - In No Acute Distress] : in no acute distress [General Appearance - Well Nourished] : well nourished [General Appearance - Well Developed] : well developed [Neck Appearance] : the appearance of the neck was normal [Neck Cervical Mass (___cm)] : no neck mass was observed [Jugular Venous Distention Increased] : there was no jugular-venous distention [Exaggerated Use Of Accessory Muscles For Inspiration] : no accessory muscle use [Heart Sounds] : normal S1 and S2 [Edema] : there was no peripheral edema [Bowel Sounds] : normal bowel sounds [Abdomen Soft] : soft [Abdomen Tenderness] : non-tender [Cervical Lymph Nodes Enlarged Posterior Bilaterally] : posterior cervical [Cervical Lymph Nodes Enlarged Anterior Bilaterally] : anterior cervical [No CVA Tenderness] : no ~M costovertebral angle tenderness [Nail Clubbing] : no clubbing  or cyanosis of the fingernails [] : no rash [No Focal Deficits] : no focal deficits [Oriented To Time, Place, And Person] : oriented to person, place, and time [Impaired Insight] : insight and judgment were intact [Affect] : the affect was normal

## 2020-02-25 LAB — IGG4 SER-MCNC: 17 MG/DL

## 2020-04-28 NOTE — H&P PST ADULT - PT NEEDS ASSIST
Pt cleared of COVID which is negative. MJ can take pt today. She will now go into Room 2217. RN to call report to [phone]. [facility] will not  pt at 4:00pm today. no

## 2020-05-14 PROBLEM — R10.12 LEFT UPPER QUADRANT PAIN: Status: ACTIVE | Noted: 2019-01-14

## 2020-08-07 ENCOUNTER — APPOINTMENT (OUTPATIENT)
Dept: GASTROENTEROLOGY | Facility: CLINIC | Age: 55
End: 2020-08-07
Payer: COMMERCIAL

## 2020-08-07 DIAGNOSIS — Z87.19 PERSONAL HISTORY OF OTHER DISEASES OF THE DIGESTIVE SYSTEM: ICD-10-CM

## 2020-08-07 PROCEDURE — 99214 OFFICE O/P EST MOD 30 MIN: CPT

## 2020-08-07 NOTE — HISTORY OF PRESENT ILLNESS
[de-identified] : Markie Hoyos MD\par [address]\[place] [address]\par \par Very pleasant 55-year-old gentleman who had diverticulitis, hospitalized to [facility] in March of this year.\par \par Unfortunately required surgery with diverting colostomy\par \par He is now doing very well and needs colonoscopy via colostomy and rectum prior to re-anastomosis\par \par No new medical problems\par \par He had previous elevated serum lipase, had work-up including endoscopic ultrasound without a source being found\par \par Eventually was told that it might of been his metformin that was causing this\par \par He is having no abdominal pain\par \par The colostomy is functioning well

## 2020-08-07 NOTE — PHYSICAL EXAM
[FreeTextEntry1] : Well healed scar lower mid line with left lower quadrant colostomy, well-functioning

## 2020-08-07 NOTE — ASSESSMENT
[FreeTextEntry1] : Impression,\par \par March of this year severe diverticulitis requiring diverting colostomy\par \par Patient now doing well and will be having reversal with reanastomosis and needs colonoscopy via ostomy and rectum\par \par Suggest,\par \par Colonoscopy via ostomy and rectum\par \par TriLyte\par \par Fleet enema x2 the morning of the procedure\par \par The laxative, or its risks benefits and alternatives have been thoroughly reviewed with the patient in great detail. The laxative instructions have been reviewed in great detail with the patient.\par \par Risks/benefits:\par The procedure, the risks and benefits and alternatives have been reviewed in great detail with the patient.  Risks including, but not limited to sedation such as cardiac and pulmonary compromise, the procedure itself such as bleeding requiring hospitalization, transfusion, surgery, temporary or permanent colostomy.  Perforation or puncture of the requiring hospitalization, surgery, temporary colostomy.\par It has been explained to the patient that though colonoscopy is thought to be the best screening exam for colon cancer and polyps, no screening exam can find all colon polyps or cancers.  \par The patient expresses understanding of the procedure and consents to undergoing the procedure.\par \par

## 2020-08-07 NOTE — CONSULT LETTER
[FreeTextEntry3] : Gurwinder Varner MD\par  [FreeTextEntry2] : Markie Hoyos MD\par [address] [facility]\[place] [address]\par

## 2020-08-07 NOTE — REASON FOR VISIT
[FreeTextEntry1] : Patient status post diverticulitis with colostomy in March of this year, now needs colonoscopy via ostomy and rectum.  Preanastomosis

## 2020-08-09 PROBLEM — Z87.19 HISTORY OF DIVERTICULITIS: Status: RESOLVED | Noted: 2020-08-07 | Resolved: 2020-08-09

## 2020-09-02 ENCOUNTER — APPOINTMENT (OUTPATIENT)
Dept: GASTROENTEROLOGY | Facility: CLINIC | Age: 55
End: 2020-09-02
Payer: COMMERCIAL

## 2020-09-02 VITALS
SYSTOLIC BLOOD PRESSURE: 120 MMHG | OXYGEN SATURATION: 96 % | WEIGHT: 176 LBS | HEIGHT: 67 IN | BODY MASS INDEX: 27.62 KG/M2 | DIASTOLIC BLOOD PRESSURE: 77 MMHG | HEART RATE: 56 BPM

## 2020-09-02 DIAGNOSIS — Z11.59 ENCOUNTER FOR SCREENING FOR OTHER VIRAL DISEASES: ICD-10-CM

## 2020-09-02 DIAGNOSIS — Z87.19 PERSONAL HISTORY OF OTHER DISEASES OF THE DIGESTIVE SYSTEM: ICD-10-CM

## 2020-09-02 DIAGNOSIS — Z01.818 ENCOUNTER FOR OTHER PREPROCEDURAL EXAMINATION: ICD-10-CM

## 2020-09-02 PROCEDURE — 99214 OFFICE O/P EST MOD 30 MIN: CPT

## 2020-09-02 NOTE — CONSULT LETTER
[Consult Letter:] : I had the pleasure of evaluating your patient, [unfilled]. [Dear  ___] : Dear  [unfilled], [Consult Closing:] : Thank you very much for allowing me to participate in the care of this patient.  If you have any questions, please do not hesitate to contact me. [Please see my note below.] : Please see my note below. [Sincerely,] : Sincerely, [FreeTextEntry2] : Markie Hoyos MD\par [address] [facility]\[place] [address]\par  [FreeTextEntry3] : Gurwinder Varner MD\par

## 2020-09-02 NOTE — PHYSICAL EXAM
[General Appearance - Alert] : alert [General Appearance - In No Acute Distress] : in no acute distress [Sclera] : the sclera and conjunctiva were normal [Neck Appearance] : the appearance of the neck was normal [Jugular Venous Distention Increased] : there was no jugular-venous distention [Neck Cervical Mass (___cm)] : no neck mass was observed [Exaggerated Use Of Accessory Muscles For Inspiration] : no accessory muscle use [Auscultation Breath Sounds / Voice Sounds] : lungs were clear to auscultation bilaterally [Respiration, Rhythm And Depth] : normal respiratory rhythm and effort [Heart Rate And Rhythm] : heart rate was normal and rhythm regular [Apical Impulse] : the apical impulse was normal [Edema] : there was no peripheral edema [Full Pulse] : the pedal pulses are present [Bowel Sounds] : normal bowel sounds [Abdomen Soft] : soft [Abdomen Tenderness] : non-tender [] : no hepato-splenomegaly [Abdomen Mass (___ Cm)] : no abdominal mass palpated [FreeTextEntry1] : Well healed scar lower mid line with left lower quadrant colostomy, well-functioning [Cervical Lymph Nodes Enlarged Posterior Bilaterally] : posterior cervical [Cervical Lymph Nodes Enlarged Anterior Bilaterally] : anterior cervical [Supraclavicular Lymph Nodes Enlarged Bilaterally] : supraclavicular [Inguinal Lymph Nodes Enlarged Bilaterally] : inguinal [Axillary Lymph Nodes Enlarged Bilaterally] : axillary [Femoral Lymph Nodes Enlarged Bilaterally] : femoral [No Spinal Tenderness] : no spinal tenderness [No CVA Tenderness] : no ~M costovertebral angle tenderness [No Focal Deficits] : no focal deficits [Oriented To Time, Place, And Person] : oriented to person, place, and time [Impaired Insight] : insight and judgment were intact [Affect] : the affect was normal

## 2020-09-02 NOTE — HISTORY OF PRESENT ILLNESS
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

## 2020-09-02 NOTE — ASSESSMENT
[FreeTextEntry1] : Impression,\par \par Recent isolated episode of right upper quadrant and flank discomfort\par \par Ultrasound showing gallbladder sludge without any thickening of the wall of amina-cholecystic fluid or dilated bile duct\par \par There was some fullness of the collecting system on ultrasound\par \par CAT scan reported normal-appearing gallbladder\par \par It was done without contrast and no dilated ureter was noted, he is status post left nephrectomy many years ago\par \par There may have been some herniation, ventral hernia with small bowel loops but no obstruction on the CAT scan\par \par Colostomy is working well\par \par Suggest,\par \par Low-fat diet\par \par HIDA scan to look to see if gallbladder is functioning well\par \par Keep colonoscopy appointment via colostomy and rectum\par \par TriLyte above and Fleet enema below\par \par Low-fat diet\par \par If he has recurrent pain go to hospital call neurosurgeon\par \par Call return anytime\par \par The laxative, or its risks benefits and alternatives have been thoroughly reviewed with the patient in great detail. The laxative instructions have been reviewed in great detail with the patient.\par \par Risks/benefits:\par The procedure, the risks and benefits and alternatives have been reviewed in great detail with the patient.  Risks including, but not limited to sedation such as cardiac and pulmonary compromise, the procedure itself such as bleeding requiring hospitalization, transfusion, surgery, temporary or permanent colostomy.  Perforation or puncture of the requiring hospitalization, surgery, temporary colostomy.\par It has been explained to the patient that though colonoscopy is thought to be the best screening exam for colon cancer and polyps, no screening exam can find all colon polyps or cancers.  \par The patient expresses understanding of the procedure and consents to undergoing the procedure.\par \par

## 2020-09-22 ENCOUNTER — APPOINTMENT (OUTPATIENT)
Dept: NUCLEAR MEDICINE | Facility: HOSPITAL | Age: 55
End: 2020-09-22

## 2020-09-23 ENCOUNTER — RESULT REVIEW (OUTPATIENT)
Age: 55
End: 2020-09-23

## 2020-09-23 ENCOUNTER — APPOINTMENT (OUTPATIENT)
Dept: NUCLEAR MEDICINE | Facility: CLINIC | Age: 55
End: 2020-09-23

## 2020-09-23 ENCOUNTER — OUTPATIENT (OUTPATIENT)
Dept: OUTPATIENT SERVICES | Facility: HOSPITAL | Age: 55
LOS: 1 days | End: 2020-09-23
Payer: COMMERCIAL

## 2020-09-23 DIAGNOSIS — Z90.5 ACQUIRED ABSENCE OF KIDNEY: Chronic | ICD-10-CM

## 2020-09-23 DIAGNOSIS — Z43.3 ENCOUNTER FOR ATTENTION TO COLOSTOMY: ICD-10-CM

## 2020-09-23 DIAGNOSIS — K40.90 UNILATERAL INGUINAL HERNIA, WITHOUT OBSTRUCTION OR GANGRENE, NOT SPECIFIED AS RECURRENT: Chronic | ICD-10-CM

## 2020-09-23 DIAGNOSIS — Z98.890 OTHER SPECIFIED POSTPROCEDURAL STATES: Chronic | ICD-10-CM

## 2020-09-23 PROCEDURE — 78227 HEPATOBIL SYST IMAGE W/DRUG: CPT | Mod: 26

## 2020-09-28 ENCOUNTER — APPOINTMENT (OUTPATIENT)
Dept: DISASTER EMERGENCY | Facility: CLINIC | Age: 55
End: 2020-09-28

## 2020-09-29 LAB — SARS-COV-2 N GENE NPH QL NAA+PROBE: NOT DETECTED

## 2020-10-01 ENCOUNTER — APPOINTMENT (OUTPATIENT)
Dept: GASTROENTEROLOGY | Facility: AMBULATORY MEDICAL SERVICES | Age: 55
End: 2020-10-01

## 2020-12-01 ENCOUNTER — APPOINTMENT (OUTPATIENT)
Dept: GASTROENTEROLOGY | Facility: AMBULATORY MEDICAL SERVICES | Age: 55
End: 2020-12-01

## 2020-12-01 ENCOUNTER — APPOINTMENT (OUTPATIENT)
Dept: GASTROENTEROLOGY | Facility: AMBULATORY MEDICAL SERVICES | Age: 55
End: 2020-12-01
Payer: COMMERCIAL

## 2020-12-01 PROCEDURE — 45380 COLONOSCOPY AND BIOPSY: CPT

## 2020-12-03 ENCOUNTER — NON-APPOINTMENT (OUTPATIENT)
Age: 55
End: 2020-12-03

## 2020-12-08 ENCOUNTER — NON-APPOINTMENT (OUTPATIENT)
Age: 55
End: 2020-12-08

## 2021-01-11 ENCOUNTER — APPOINTMENT (OUTPATIENT)
Dept: COLORECTAL SURGERY | Facility: CLINIC | Age: 56
End: 2021-01-11
Payer: COMMERCIAL

## 2021-01-11 VITALS
TEMPERATURE: 95.8 F | BODY MASS INDEX: 27.62 KG/M2 | HEIGHT: 67 IN | HEART RATE: 106 BPM | WEIGHT: 176 LBS | SYSTOLIC BLOOD PRESSURE: 146 MMHG | DIASTOLIC BLOOD PRESSURE: 75 MMHG | RESPIRATION RATE: 16 BRPM

## 2021-01-11 PROCEDURE — 99072 ADDL SUPL MATRL&STAF TM PHE: CPT

## 2021-01-11 PROCEDURE — 99203 OFFICE O/P NEW LOW 30 MIN: CPT

## 2021-01-11 RX ORDER — NEOMYCIN SULFATE 500 MG/1
500 TABLET ORAL
Qty: 6 | Refills: 0 | Status: ACTIVE | COMMUNITY
Start: 2021-01-11 | End: 1900-01-01

## 2021-01-11 RX ORDER — METRONIDAZOLE 500 MG/1
500 TABLET ORAL
Qty: 3 | Refills: 0 | Status: ACTIVE | COMMUNITY
Start: 2021-01-11 | End: 1900-01-01

## 2021-01-11 RX ORDER — POLYETHYLENE GLYCOL-3350 AND ELECTROLYTES 236; 6.74; 5.86; 2.97; 22.74 G/274.31G; G/274.31G; G/274.31G; G/274.31G; G/274.31G
236 POWDER, FOR SOLUTION ORAL
Qty: 1 | Refills: 0 | Status: COMPLETED | COMMUNITY
Start: 2019-01-14 | End: 2021-01-11

## 2021-01-11 RX ORDER — SODIUM SULFATE, POTASSIUM SULFATE, MAGNESIUM SULFATE 17.5; 3.13; 1.6 G/ML; G/ML; G/ML
17.5-3.13-1.6 SOLUTION, CONCENTRATE ORAL
Qty: 1 | Refills: 0 | Status: COMPLETED | COMMUNITY
Start: 2020-10-07 | End: 2021-01-11

## 2021-01-11 NOTE — PHYSICAL EXAM
[No Rash or Lesion] : No rash or lesion [Alert] : alert [Oriented to Person] : oriented to person [Oriented to Place] : oriented to place [Oriented to Time] : oriented to time [Calm] : calm [de-identified] : large midline incision, LLQ colostomy with appreciable though reducible hernia, soft, NTND [de-identified] : No apparent distress [de-identified] : Normocephalic atraumatic [de-identified] : Moving all extremities x4

## 2021-01-11 NOTE — ASSESSMENT
[FreeTextEntry1] : Mr. De Anda presents to the office for discussion of colostomy reversal after undergoing urgent Watson's procedure in March 2020 for perforated sigmoid diverticulitis.  He had a colonoscopy recently which did not reveal any polyps, masses or mucosal disease that would otherwise prohibit colostomy reversal. \par \par I also reviewed with him the three potential outcomes of colostomy reversal:\par 1) Inability to reverse the colostomy because of a frozen pelvis\par 2 ) ability to reverse the colostomy, but with an associated low pelvic anastomosis which would require a diverting ileostomy to avoid consequences of an increased chance of anastomotic leak with pelvic sepsis\par 3) reversal of the colostomy \par \par He is also aware of the need to perform an antibiotic and mechanical bowel prep. Duration of procedure, length of hospital stay, period of convalescence including limitations of activity were all reviewed.   All questions were answered to his satisfaction.  We will likely plan for February 22, 2021.\par Preoperatively, I will obtain a CT of the abdomen and pelvis to better appreciate his anatomy in anticipation of surgical planning.  He understands, and is agreeable.\par

## 2021-01-11 NOTE — HISTORY OF PRESENT ILLNESS
[FreeTextEntry1] : Mr. De Anda presents to the office for consultation. In 3/29/2020, he underwent urgent Hartmanns procedure at [facility] for severe sigmoid diverticulitis.  By report, his hospital course was complicated by intra-abdominal abscess which was percutaneously drained.  He has since undergone screening colonoscopy with Dr. Varner in December/2020 and is eager for colostomy reversal.  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

## 2021-01-14 ENCOUNTER — OUTPATIENT (OUTPATIENT)
Dept: OUTPATIENT SERVICES | Facility: HOSPITAL | Age: 56
LOS: 1 days | End: 2021-01-14
Payer: COMMERCIAL

## 2021-01-14 ENCOUNTER — APPOINTMENT (OUTPATIENT)
Dept: CT IMAGING | Facility: CLINIC | Age: 56
End: 2021-01-14
Payer: COMMERCIAL

## 2021-01-14 DIAGNOSIS — Z00.8 ENCOUNTER FOR OTHER GENERAL EXAMINATION: ICD-10-CM

## 2021-01-14 DIAGNOSIS — Z43.3 ENCOUNTER FOR ATTENTION TO COLOSTOMY: ICD-10-CM

## 2021-01-14 DIAGNOSIS — K40.90 UNILATERAL INGUINAL HERNIA, WITHOUT OBSTRUCTION OR GANGRENE, NOT SPECIFIED AS RECURRENT: Chronic | ICD-10-CM

## 2021-01-14 DIAGNOSIS — Z90.5 ACQUIRED ABSENCE OF KIDNEY: Chronic | ICD-10-CM

## 2021-01-14 DIAGNOSIS — Z98.890 OTHER SPECIFIED POSTPROCEDURAL STATES: Chronic | ICD-10-CM

## 2021-01-14 PROCEDURE — 74177 CT ABD & PELVIS W/CONTRAST: CPT | Mod: 26

## 2021-01-14 PROCEDURE — 74177 CT ABD & PELVIS W/CONTRAST: CPT

## 2021-01-19 DIAGNOSIS — D49.0 NEOPLASM OF UNSPECIFIED BEHAVIOR OF DIGESTIVE SYSTEM: ICD-10-CM

## 2021-01-19 DIAGNOSIS — R91.1 SOLITARY PULMONARY NODULE: ICD-10-CM

## 2021-01-29 DIAGNOSIS — R93.89 ABNORMAL FINDINGS ON DIAGNOSTIC IMAGING OF OTHER SPECIFIED BODY STRUCTURES: ICD-10-CM

## 2021-02-07 ENCOUNTER — OUTPATIENT (OUTPATIENT)
Dept: OUTPATIENT SERVICES | Facility: HOSPITAL | Age: 56
LOS: 1 days | End: 2021-02-07
Payer: COMMERCIAL

## 2021-02-07 ENCOUNTER — APPOINTMENT (OUTPATIENT)
Dept: MRI IMAGING | Facility: CLINIC | Age: 56
End: 2021-02-07
Payer: COMMERCIAL

## 2021-02-07 DIAGNOSIS — Z00.8 ENCOUNTER FOR OTHER GENERAL EXAMINATION: ICD-10-CM

## 2021-02-07 DIAGNOSIS — R93.89 ABNORMAL FINDINGS ON DIAGNOSTIC IMAGING OF OTHER SPECIFIED BODY STRUCTURES: ICD-10-CM

## 2021-02-07 DIAGNOSIS — Z98.890 OTHER SPECIFIED POSTPROCEDURAL STATES: Chronic | ICD-10-CM

## 2021-02-07 DIAGNOSIS — Z90.5 ACQUIRED ABSENCE OF KIDNEY: Chronic | ICD-10-CM

## 2021-02-07 DIAGNOSIS — K40.90 UNILATERAL INGUINAL HERNIA, WITHOUT OBSTRUCTION OR GANGRENE, NOT SPECIFIED AS RECURRENT: Chronic | ICD-10-CM

## 2021-02-07 PROCEDURE — 74183 MRI ABD W/O CNTR FLWD CNTR: CPT | Mod: 26

## 2021-02-07 PROCEDURE — 74183 MRI ABD W/O CNTR FLWD CNTR: CPT

## 2021-02-10 ENCOUNTER — APPOINTMENT (OUTPATIENT)
Dept: CT IMAGING | Facility: CLINIC | Age: 56
End: 2021-02-10

## 2021-02-10 ENCOUNTER — OUTPATIENT (OUTPATIENT)
Dept: OUTPATIENT SERVICES | Facility: HOSPITAL | Age: 56
LOS: 1 days | End: 2021-02-10
Payer: COMMERCIAL

## 2021-02-10 DIAGNOSIS — Z90.5 ACQUIRED ABSENCE OF KIDNEY: Chronic | ICD-10-CM

## 2021-02-10 DIAGNOSIS — Z98.890 OTHER SPECIFIED POSTPROCEDURAL STATES: Chronic | ICD-10-CM

## 2021-02-10 DIAGNOSIS — R91.1 SOLITARY PULMONARY NODULE: ICD-10-CM

## 2021-02-10 DIAGNOSIS — K40.90 UNILATERAL INGUINAL HERNIA, WITHOUT OBSTRUCTION OR GANGRENE, NOT SPECIFIED AS RECURRENT: Chronic | ICD-10-CM

## 2021-02-10 PROCEDURE — 71250 CT THORAX DX C-: CPT

## 2021-02-10 PROCEDURE — 71250 CT THORAX DX C-: CPT | Mod: 26

## 2021-02-11 ENCOUNTER — NON-APPOINTMENT (OUTPATIENT)
Age: 56
End: 2021-02-11

## 2021-02-11 ENCOUNTER — OUTPATIENT (OUTPATIENT)
Dept: OUTPATIENT SERVICES | Facility: HOSPITAL | Age: 56
LOS: 1 days | End: 2021-02-11
Payer: COMMERCIAL

## 2021-02-11 VITALS
TEMPERATURE: 97 F | RESPIRATION RATE: 20 BRPM | HEIGHT: 67 IN | SYSTOLIC BLOOD PRESSURE: 142 MMHG | DIASTOLIC BLOOD PRESSURE: 72 MMHG | HEART RATE: 76 BPM | WEIGHT: 189.6 LBS

## 2021-02-11 DIAGNOSIS — Z98.890 OTHER SPECIFIED POSTPROCEDURAL STATES: Chronic | ICD-10-CM

## 2021-02-11 DIAGNOSIS — K40.90 UNILATERAL INGUINAL HERNIA, WITHOUT OBSTRUCTION OR GANGRENE, NOT SPECIFIED AS RECURRENT: Chronic | ICD-10-CM

## 2021-02-11 DIAGNOSIS — I10 ESSENTIAL (PRIMARY) HYPERTENSION: ICD-10-CM

## 2021-02-11 DIAGNOSIS — Z01.818 ENCOUNTER FOR OTHER PREPROCEDURAL EXAMINATION: ICD-10-CM

## 2021-02-11 DIAGNOSIS — E11.9 TYPE 2 DIABETES MELLITUS WITHOUT COMPLICATIONS: ICD-10-CM

## 2021-02-11 DIAGNOSIS — Z43.3 ENCOUNTER FOR ATTENTION TO COLOSTOMY: ICD-10-CM

## 2021-02-11 DIAGNOSIS — Z29.9 ENCOUNTER FOR PROPHYLACTIC MEASURES, UNSPECIFIED: ICD-10-CM

## 2021-02-11 DIAGNOSIS — Z90.5 ACQUIRED ABSENCE OF KIDNEY: Chronic | ICD-10-CM

## 2021-02-11 DIAGNOSIS — Z93.3 COLOSTOMY STATUS: Chronic | ICD-10-CM

## 2021-02-11 LAB
A1C WITH ESTIMATED AVERAGE GLUCOSE RESULT: 11.8 % — HIGH (ref 4–5.6)
ALBUMIN SERPL ELPH-MCNC: 4.3 G/DL — SIGNIFICANT CHANGE UP (ref 3.3–5.2)
ALP SERPL-CCNC: 133 U/L — HIGH (ref 40–120)
ALT FLD-CCNC: 31 U/L — SIGNIFICANT CHANGE UP
ANION GAP SERPL CALC-SCNC: 15 MMOL/L — SIGNIFICANT CHANGE UP (ref 5–17)
APTT BLD: 30.3 SEC — SIGNIFICANT CHANGE UP (ref 27.5–35.5)
AST SERPL-CCNC: 16 U/L — SIGNIFICANT CHANGE UP
BASOPHILS # BLD AUTO: 0.1 K/UL — SIGNIFICANT CHANGE UP (ref 0–0.2)
BASOPHILS NFR BLD AUTO: 1.1 % — SIGNIFICANT CHANGE UP (ref 0–2)
BILIRUB SERPL-MCNC: 0.3 MG/DL — LOW (ref 0.4–2)
BLD GP AB SCN SERPL QL: SIGNIFICANT CHANGE UP
BUN SERPL-MCNC: 26 MG/DL — HIGH (ref 8–20)
CALCIUM SERPL-MCNC: 9.7 MG/DL — SIGNIFICANT CHANGE UP (ref 8.6–10.2)
CHLORIDE SERPL-SCNC: 100 MMOL/L — SIGNIFICANT CHANGE UP (ref 98–107)
CO2 SERPL-SCNC: 24 MMOL/L — SIGNIFICANT CHANGE UP (ref 22–29)
CREAT SERPL-MCNC: 0.76 MG/DL — SIGNIFICANT CHANGE UP (ref 0.5–1.3)
EOSINOPHIL # BLD AUTO: 0.24 K/UL — SIGNIFICANT CHANGE UP (ref 0–0.5)
EOSINOPHIL NFR BLD AUTO: 2.7 % — SIGNIFICANT CHANGE UP (ref 0–6)
ESTIMATED AVERAGE GLUCOSE: 292 MG/DL — HIGH (ref 68–114)
GLUCOSE SERPL-MCNC: 270 MG/DL — HIGH (ref 70–99)
HCT VFR BLD CALC: 50.5 % — HIGH (ref 39–50)
HGB BLD-MCNC: 16.7 G/DL — SIGNIFICANT CHANGE UP (ref 13–17)
IMM GRANULOCYTES NFR BLD AUTO: 0.3 % — SIGNIFICANT CHANGE UP (ref 0–1.5)
INR BLD: 0.89 RATIO — SIGNIFICANT CHANGE UP (ref 0.88–1.16)
LYMPHOCYTES # BLD AUTO: 2.92 K/UL — SIGNIFICANT CHANGE UP (ref 1–3.3)
LYMPHOCYTES # BLD AUTO: 33.1 % — SIGNIFICANT CHANGE UP (ref 13–44)
MCHC RBC-ENTMCNC: 28.8 PG — SIGNIFICANT CHANGE UP (ref 27–34)
MCHC RBC-ENTMCNC: 33.1 GM/DL — SIGNIFICANT CHANGE UP (ref 32–36)
MCV RBC AUTO: 87.2 FL — SIGNIFICANT CHANGE UP (ref 80–100)
MONOCYTES # BLD AUTO: 0.72 K/UL — SIGNIFICANT CHANGE UP (ref 0–0.9)
MONOCYTES NFR BLD AUTO: 8.2 % — SIGNIFICANT CHANGE UP (ref 2–14)
NEUTROPHILS # BLD AUTO: 4.8 K/UL — SIGNIFICANT CHANGE UP (ref 1.8–7.4)
NEUTROPHILS NFR BLD AUTO: 54.6 % — SIGNIFICANT CHANGE UP (ref 43–77)
PLATELET # BLD AUTO: 235 K/UL — SIGNIFICANT CHANGE UP (ref 150–400)
POTASSIUM SERPL-MCNC: 4.6 MMOL/L — SIGNIFICANT CHANGE UP (ref 3.5–5.3)
POTASSIUM SERPL-SCNC: 4.6 MMOL/L — SIGNIFICANT CHANGE UP (ref 3.5–5.3)
PROT SERPL-MCNC: 7.3 G/DL — SIGNIFICANT CHANGE UP (ref 6.6–8.7)
PROTHROM AB SERPL-ACNC: 10.4 SEC — LOW (ref 10.6–13.6)
RBC # BLD: 5.79 M/UL — SIGNIFICANT CHANGE UP (ref 4.2–5.8)
RBC # FLD: 13.1 % — SIGNIFICANT CHANGE UP (ref 10.3–14.5)
SODIUM SERPL-SCNC: 138 MMOL/L — SIGNIFICANT CHANGE UP (ref 135–145)
WBC # BLD: 8.81 K/UL — SIGNIFICANT CHANGE UP (ref 3.8–10.5)
WBC # FLD AUTO: 8.81 K/UL — SIGNIFICANT CHANGE UP (ref 3.8–10.5)

## 2021-02-11 PROCEDURE — 93010 ELECTROCARDIOGRAM REPORT: CPT

## 2021-02-11 PROCEDURE — G0463: CPT

## 2021-02-11 PROCEDURE — 93005 ELECTROCARDIOGRAM TRACING: CPT

## 2021-02-11 RX ORDER — FLUTICASONE FUROATE, UMECLIDINIUM BROMIDE AND VILANTEROL TRIFENATATE 200; 62.5; 25 UG/1; UG/1; UG/1
1 POWDER RESPIRATORY (INHALATION)
Qty: 0 | Refills: 0 | DISCHARGE

## 2021-02-11 RX ORDER — ALBUTEROL 90 UG/1
3 AEROSOL, METERED ORAL
Qty: 0 | Refills: 0 | DISCHARGE

## 2021-02-11 NOTE — PATIENT PROFILE ADULT - NSPROGENSOURCEINFO_GEN_A_NUR
Pt travelled to [facility] on 1/24/2021. Pt had Covid test on 1/31/2021 = negative/patient

## 2021-02-11 NOTE — H&P PST ADULT - ASSESSMENT
CAPRINI SCORE [CLOT]    AGE RELATED RISK FACTORS                                                       MOBILITY RELATED FACTORS  [ x] Age 41-60 years                                            (1 Point)                  [ ] Bed rest                                                        (1 Point)  [ ] Age: 61-74 years                                           (2 Points)                 [ ] Plaster cast                                                   (2 Points)  [ ] Age= 75 years                                              (3 Points)                 [ ] Bed bound for more than 72 hours                 (2 Points)    DISEASE RELATED RISK FACTORS                                               GENDER SPECIFIC FACTORS  [ ] Edema in the lower extremities                       (1 Point)                  [ ] Pregnancy                                                     (1 Point)  [x ] Varicose veins                                               (1 Point)                  [ ] Post-partum < 6 weeks                                   (1 Point)             [ ]x BMI > 25 Kg/m2                                            (1 Point)                  [ ] Hormonal therapy  or oral contraception          (1 Point)                 [ ] Sepsis (in the previous month)                        (1 Point)                  [ ] History of pregnancy complications                 (1 point)  [ ] Pneumonia or serious lung disease                                               [ ] Unexplained or recurrent                     (1 Point)           (in the previous month)                               (1 Point)  [ ] Abnormal pulmonary function test                     (1 Point)                 SURGERY RELATED RISK FACTORS  [ ] Acute myocardial infarction                              (1 Point)                 [ ]  Section                                             (1 Point)  [ ] Congestive heart failure (in the previous month)  (1 Point)               [ ] Minor surgery                                                  (1 Point)   [ ] Inflammatory bowel disease                             (1 Point)                 [ ] Arthroscopic surgery                                        (2 Points)  [ ] Central venous access                                      (2 Points)                [x ] General surgery lasting more than 45 minutes   (2 Points)       [ ] Stroke (in the previous month)                          (5 Points)               [ ] Elective arthroplasty                                         (5 Points)                                                                                                                                               HEMATOLOGY RELATED FACTORS                                                 TRAUMA RELATED RISK FACTORS  [ ] Prior episodes of VTE                                     (3 Points)                [ ] Fracture of the hip, pelvis, or leg                       (5 Points)  [ ] Positive family history for VTE                         (3 Points)                 [ ] Acute spinal cord injury (in the previous month)  (5 Points)  [ ] Prothrombin 18339 A                                     (3 Points)                 [ ] Paralysis  (less than 1 month)                             (5 Points)  [ ] Factor V Leiden                                             (3 Points)                  [ ] Multiple Trauma within 1 month                        (5 Points)  [ ] Lupus anticoagulants                                     (3 Points)                                                           [ ] Anticardiolipin antibodies                               (3 Points)                                                       [ ] High homocysteine in the blood                      (3 Points)                                             [ ] Other congenital or acquired thrombophilia      (3 Points)                                                [ ] Heparin induced thrombocytopenia                  (3 Points)                                          Total Score [    5     ]    Caprini Score 0 - 2:  Low Risk, No VTE Prophylaxis required for most patients, encourage ambulation  Caprini Score 3 - 6:  At Risk, pharmacologic VTE prophylaxis is indicated for most patients (in the absence of a contraindication)  Caprini Score Greater than or = 7:  High Risk, pharmacologic VTE prophylaxis is indicated for most patients (in the absence of a contraindication)    56 year old male present for PSt for upcoming colostomy reversal.  His is schedule for open colostomy reversal, possible ileostomy any related procedures with Dr. Aguila on 21  Patient educated on written and verbal preop instructions.   medications reviewed, instructions given on what medications to take and what not to take.  Pt instructed to stop ASA/Herbals or anti-inflamatory meds one week prior to surgery and discuss with PMD.

## 2021-02-11 NOTE — PATIENT PROFILE ADULT - NSPROHMSYMPCOND_GEN_A_NUR
diabetes NP, instructed pt on pre-op instructions/teaching, tips for safer surgery, pain management scale, pre-surgical infection prevention instructions, covid swab appt info, diabetes club info given. Pt verbalized understanding of all instructions given./diabetes

## 2021-02-11 NOTE — H&P PST ADULT - NSICDXPROBLEM_GEN_ALL_CORE_FT
PROBLEM DIAGNOSES  Problem: HTN (hypertension)  Assessment and Plan: routine labs and ekg  medical clearance  Asked the patient to take the Blood pressure medication/ heart medication on DOP.      Problem: Diabetes mellitus  Assessment and Plan: continue medication as directed  finger stick on DOP      Problem: Encounter for attention to colostomy  Assessment and Plan:  open colostomy reversal, possible ileostomy any related procedures with Dr. Aguila on 2/22/21      Problem: Need for prophylactic measure  Assessment and Plan: High risk,  Surgical team should assess /Strongly recommend pharmacological and mechanical measures for VTE prophylaxis

## 2021-02-11 NOTE — H&P PST ADULT - GASTROINTESTINAL COMMENTS
colostomy, incision hernia + colotomy functional, + incisional hernia + colostomy functional, + incisional hernia

## 2021-02-11 NOTE — H&P PST ADULT - NSICDXPASTMEDICALHX_GEN_ALL_CORE_FT
PAST MEDICAL HISTORY:  Abdominal pain     Asthma     Current smoker     Diabetes mellitus type 2    GERD (gastroesophageal reflux disease)     History of colon resection     History of diverticulitis     Hyperlipidemia     Sleep apnea resloved after wieght loss     PAST MEDICAL HISTORY:  Abdominal pain     Asthma     Current smoker     Diabetes mellitus type 2    GERD (gastroesophageal reflux disease)     History of colon resection     History of diverticulitis     Hyperlipidemia     Lung nodule Two small solid nodules (0.5cm) are noted in the right upper and left lower lobes.    Sleep apnea resloved after wieght loss

## 2021-02-11 NOTE — H&P PST ADULT - NSICDXPASTSURGICALHX_GEN_ALL_CORE_FT
PAST SURGICAL HISTORY:  H/O colonoscopy 6/2019    History of endoscopy 6/2019    Inguinal hernia left    S/P arthroscopy left, torn meniscus 25yrs ago    S/p nephrectomy left, congenital defect  7y/o    Status post colostomy

## 2021-02-11 NOTE — H&P PST ADULT - LAB RESULTS AND INTERPRETATION
pt is known Diabatic on metformin with elevated A1c of 11.8  Patient and surgeon office made aware.  Per surgeon office- Surgery will be postponed to allow time to optimize patient

## 2021-02-11 NOTE — H&P PST ADULT - HISTORY OF PRESENT ILLNESS
56 year old male present for PSt for upcoming colostomy reversal.  In 3/29/2020, he underwent urgent Hartmanns procedure at [facility] for severe sigmoid diverticulitis. By report, his hospital course was complicated by intra-abdominal abscess which was percutaneously drained. He has since undergone screening colonoscopy with Dr. Varner in December/2020 and is eager for colostomy reversal. His is schedule for open colostomy reversal, possible ileostomy any related procedures with Dr. Aguila on 2/22/21

## 2021-02-12 ENCOUNTER — NON-APPOINTMENT (OUTPATIENT)
Age: 56
End: 2021-02-12

## 2021-02-19 ENCOUNTER — APPOINTMENT (OUTPATIENT)
Dept: DISASTER EMERGENCY | Facility: CLINIC | Age: 56
End: 2021-02-19

## 2021-04-07 ENCOUNTER — EMERGENCY (EMERGENCY)
Facility: HOSPITAL | Age: 56
LOS: 1 days | Discharge: HOME | End: 2021-04-07
Attending: EMERGENCY MEDICINE
Payer: COMMERCIAL

## 2021-04-07 VITALS
TEMPERATURE: 99 F | SYSTOLIC BLOOD PRESSURE: 156 MMHG | DIASTOLIC BLOOD PRESSURE: 97 MMHG | RESPIRATION RATE: 20 BRPM | HEART RATE: 100 BPM | WEIGHT: 186.07 LBS | OXYGEN SATURATION: 96 % | HEIGHT: 67 IN

## 2021-04-07 DIAGNOSIS — Z93.3 COLOSTOMY STATUS: Chronic | ICD-10-CM

## 2021-04-07 DIAGNOSIS — Z90.5 ACQUIRED ABSENCE OF KIDNEY: Chronic | ICD-10-CM

## 2021-04-07 DIAGNOSIS — K40.90 UNILATERAL INGUINAL HERNIA, WITHOUT OBSTRUCTION OR GANGRENE, NOT SPECIFIED AS RECURRENT: Chronic | ICD-10-CM

## 2021-04-07 DIAGNOSIS — Z98.890 OTHER SPECIFIED POSTPROCEDURAL STATES: Chronic | ICD-10-CM

## 2021-04-07 LAB
APTT BLD: 24.7 SEC — LOW (ref 27.5–35.5)
BASOPHILS # BLD AUTO: 0.08 K/UL — SIGNIFICANT CHANGE UP (ref 0–0.2)
BASOPHILS NFR BLD AUTO: 0.8 % — SIGNIFICANT CHANGE UP (ref 0–2)
EOSINOPHIL # BLD AUTO: 0.12 K/UL — SIGNIFICANT CHANGE UP (ref 0–0.5)
EOSINOPHIL NFR BLD AUTO: 1.2 % — SIGNIFICANT CHANGE UP (ref 0–6)
HCT VFR BLD CALC: 50.5 % — HIGH (ref 39–50)
HGB BLD-MCNC: 16.3 G/DL — SIGNIFICANT CHANGE UP (ref 13–17)
IMM GRANULOCYTES NFR BLD AUTO: 0.3 % — SIGNIFICANT CHANGE UP (ref 0–1.5)
INR BLD: 1.05 RATIO — SIGNIFICANT CHANGE UP (ref 0.88–1.16)
LACTATE BLDV-MCNC: 0.7 MMOL/L — SIGNIFICANT CHANGE UP (ref 0.5–2)
LYMPHOCYTES # BLD AUTO: 2.65 K/UL — SIGNIFICANT CHANGE UP (ref 1–3.3)
LYMPHOCYTES # BLD AUTO: 26.2 % — SIGNIFICANT CHANGE UP (ref 13–44)
MCHC RBC-ENTMCNC: 28.4 PG — SIGNIFICANT CHANGE UP (ref 27–34)
MCHC RBC-ENTMCNC: 32.3 GM/DL — SIGNIFICANT CHANGE UP (ref 32–36)
MCV RBC AUTO: 88 FL — SIGNIFICANT CHANGE UP (ref 80–100)
MONOCYTES # BLD AUTO: 0.9 K/UL — SIGNIFICANT CHANGE UP (ref 0–0.9)
MONOCYTES NFR BLD AUTO: 8.9 % — SIGNIFICANT CHANGE UP (ref 2–14)
NEUTROPHILS # BLD AUTO: 6.35 K/UL — SIGNIFICANT CHANGE UP (ref 1.8–7.4)
NEUTROPHILS NFR BLD AUTO: 62.6 % — SIGNIFICANT CHANGE UP (ref 43–77)
PLATELET # BLD AUTO: 228 K/UL — SIGNIFICANT CHANGE UP (ref 150–400)
PROTHROM AB SERPL-ACNC: 12.1 SEC — SIGNIFICANT CHANGE UP (ref 10.6–13.6)
RBC # BLD: 5.74 M/UL — SIGNIFICANT CHANGE UP (ref 4.2–5.8)
RBC # FLD: 13.2 % — SIGNIFICANT CHANGE UP (ref 10.3–14.5)
WBC # BLD: 10.13 K/UL — SIGNIFICANT CHANGE UP (ref 3.8–10.5)
WBC # FLD AUTO: 10.13 K/UL — SIGNIFICANT CHANGE UP (ref 3.8–10.5)

## 2021-04-07 PROCEDURE — 93010 ELECTROCARDIOGRAM REPORT: CPT

## 2021-04-07 PROCEDURE — 71045 X-RAY EXAM CHEST 1 VIEW: CPT | Mod: 26

## 2021-04-07 PROCEDURE — 99285 EMERGENCY DEPT VISIT HI MDM: CPT

## 2021-04-07 RX ORDER — IOHEXOL 300 MG/ML
30 INJECTION, SOLUTION INTRAVENOUS ONCE
Refills: 0 | Status: COMPLETED | OUTPATIENT
Start: 2021-04-07 | End: 2021-04-07

## 2021-04-07 RX ORDER — ONDANSETRON 8 MG/1
4 TABLET, FILM COATED ORAL ONCE
Refills: 0 | Status: COMPLETED | OUTPATIENT
Start: 2021-04-07 | End: 2021-04-07

## 2021-04-07 RX ORDER — SODIUM CHLORIDE 9 MG/ML
1000 INJECTION INTRAMUSCULAR; INTRAVENOUS; SUBCUTANEOUS ONCE
Refills: 0 | Status: COMPLETED | OUTPATIENT
Start: 2021-04-07 | End: 2021-04-07

## 2021-04-07 RX ORDER — FAMOTIDINE 10 MG/ML
20 INJECTION INTRAVENOUS ONCE
Refills: 0 | Status: COMPLETED | OUTPATIENT
Start: 2021-04-07 | End: 2021-04-07

## 2021-04-07 RX ADMIN — SODIUM CHLORIDE 1000 MILLILITER(S): 9 INJECTION INTRAMUSCULAR; INTRAVENOUS; SUBCUTANEOUS at 23:18

## 2021-04-07 RX ADMIN — IOHEXOL 30 MILLILITER(S): 300 INJECTION, SOLUTION INTRAVENOUS at 23:20

## 2021-04-07 RX ADMIN — ONDANSETRON 4 MILLIGRAM(S): 8 TABLET, FILM COATED ORAL at 23:23

## 2021-04-07 RX ADMIN — FAMOTIDINE 20 MILLIGRAM(S): 10 INJECTION INTRAVENOUS at 23:20

## 2021-04-07 NOTE — ED PROVIDER NOTE - PMH
Abdominal pain    Asthma    Current smoker    Diabetes mellitus  type 2  GERD (gastroesophageal reflux disease)    History of colon resection    History of diverticulitis    Hyperlipidemia    Lung nodule  Two small solid nodules (0.5cm) are noted in the right upper and left lower lobes.  Sleep apnea  resloved after wieght loss

## 2021-04-07 NOTE — ED PROVIDER NOTE - CARE PROVIDER_API CALL
Odilia Aguila)  ColonRectal Surgery; Surgery  [address] [place] [address]  Phone: [phone]  Fax: [phone]  Follow Up Time:

## 2021-04-07 NOTE — ED PROVIDER NOTE - NSFOLLOWUPINSTRUCTIONS_ED_ALL_ED_FT
- Follow up with your doctor within 2-3 days.   - Return to the ED for any new or worsening symptoms.   - Follow-up with Dr. Muller within 1-2 weeks for further evaluation of symptoms and for further management of hernia.     Hernia    A hernia is when fat or the intestines push through a weak area in the abdominal wall. This can occur around the belly button (umbilical hernia) or where the leg meets the lower abdomen (inguinal hernia). This creates a rounded lump (bulge). Hernias that can be pushed back into the belly are called reducible and those that cannot are called incarcerated. Hernias that are not reducible and lose their blood supply are called strangulated and require emergency surgery. Hernias can be caused or worsened when straining during bowel movements or lifting heavy objects as well as coughing or sneezing. Surgery may be helpful in treating this condition so follow up with a surgeon.    SEEK IMMEDIATE MEDICAL CARE IF YOU HAVE ANY OF THE FOLLOWING SYMPTOMS: worsening abdominal pain, change in color over the hernia, nausea/vomiting, or inability to have a bowel movement or pass gas.     Abdominal Pain    Many things can cause abdominal pain. Many times, abdominal pain is not caused by a disease and will improve without treatment. Your health care provider will do a physical exam to determine if there is a dangerous cause of your pain; blood tests and imaging may help determine the cause of your pain. However, in many cases, no cause may be found and you may need further testing as an outpatient. Monitor your abdominal pain for any changes.     SEEK IMMEDIATE MEDICAL CARE IF YOU HAVE ANY OF THE FOLLOWING SYMPTOMS: worsening abdominal pain, uncontrollable vomiting, profuse diarrhea, inability to have bowel movements or pass gas, black or bloody stools, fever accompanying chest pain or back pain, or fainting. These symptoms may represent a serious problem that is an emergency. Do not wait to see if the symptoms will go away. Get medical help right away. Call 911 and do not drive yourself to the hospital.

## 2021-04-07 NOTE — ED PROVIDER NOTE - PSH
H/O colonoscopy  6/2019  History of endoscopy  6/2019  Inguinal hernia  left  S/P arthroscopy  left, torn meniscus 25yrs ago  S/p nephrectomy  left, congenital defect  7y/o  Status post colostomy

## 2021-04-07 NOTE — ED PROVIDER NOTE - PHYSICAL EXAMINATION
General:     NAD, well-nourished, well-appearing  Head:     NC/AT, EOMI, oral mucosa moist  Neck:     trachea midline  Lungs:     CTA b/l, no w/r/r  CVS:     S1S2, RRR, no m/g/r  Abd:     TTP over LLQ over colostomy bag; +BS, s/nd, no organomegaly  Ext:    2+ radial and pedal pulses, no c/c/e  Neuro: AAOx3, no sensory/motor deficits

## 2021-04-07 NOTE — ED ADULT TRIAGE NOTE - CHIEF COMPLAINT QUOTE
Abdominal pain with onset this AM, HX of diverticulitis, colostomy bag on left abdomen, reports nausea, no fever.

## 2021-04-07 NOTE — ED PROVIDER NOTE - PROGRESS NOTE DETAILS
PA note: PT evaluated by intake physician. HPI/PE/ROS as noted above. Will follow up plan per intake physician PA note: CT showing "No bowel obstruction or inflammation. Large left lower quadrant parastomal hernia containing nonobstructed loops of small bowel." This finding also seen on CT abd from 1/14/21. pt re-assessed states his pain is resolved, abdomen soft/non-tender. Pt states he is aware of this finding and his surgeon plans to repair this. Pt given copy of all results and instructed to f/u Dr. Muller. Return precautions discussed, stable for dc.

## 2021-04-07 NOTE — ED PROVIDER NOTE - PATIENT PORTAL LINK FT
You can access the FollowMyHealth Patient Portal offered by [facility] by registering at the following website: [email or web address]. By joining [employer]’s FollowMyHealth portal, you will also be able to view your health information using other applications (apps) compatible with our system.

## 2021-04-07 NOTE — ED PROVIDER NOTE - OBJECTIVE STATEMENT
57 y/o M with PMH significant for Abdominal pain, Asthma, Diabetes mellitus  type 2, GERD, History of colon resection, History of diverticulitis, HLD, Lung nodule Two small solid nodules (0.5cm) are noted in the right upper and left lower lobes, and Sleep apnea  resolved after weight loss now p/w sudden onset mid abdominal pain that started this afternoon after eating lunch endorses nausea, denies fever, allergies, vomiting. Patient states he has not had ay output in his colostomy bag today.   Surgeon: Dr. Odilia Muller  SOCIAL: +social EtOH use, denies tobacco/illicit drug use. 56yoM; with PMH significant for Abdominal pain, Asthma, Diabetes mellitus  type 2, GERD, History of colon resection, History of diverticulitis, HLD, Lung nodule Two small solid nodules (0.5cm) are noted in the right upper and left lower lobes, and JESSU;   now p/w abd pain--epigastric, to LLQ, cramping, worse this afternoon after eating lunch. c/o associated n/v.  denies f/c/s. denies diarrhea. reports decreased output from colostomy.    Surgeon: Dr. Odilia Muller  SOCIAL: +social EtOH use, denies tobacco/illicit drug use.

## 2021-04-08 PROBLEM — Z87.19 PERSONAL HISTORY OF OTHER DISEASES OF THE DIGESTIVE SYSTEM: Chronic | Status: ACTIVE | Noted: 2021-02-11

## 2021-04-08 PROBLEM — R91.1 SOLITARY PULMONARY NODULE: Chronic | Status: ACTIVE | Noted: 2021-02-11

## 2021-04-08 LAB
ALBUMIN SERPL ELPH-MCNC: 4.5 G/DL — SIGNIFICANT CHANGE UP (ref 3.3–5.2)
ALP SERPL-CCNC: 90 U/L — SIGNIFICANT CHANGE UP (ref 40–120)
ALT FLD-CCNC: 25 U/L — SIGNIFICANT CHANGE UP
ANION GAP SERPL CALC-SCNC: 14 MMOL/L — SIGNIFICANT CHANGE UP (ref 5–17)
AST SERPL-CCNC: 23 U/L — SIGNIFICANT CHANGE UP
BILIRUB SERPL-MCNC: 0.6 MG/DL — SIGNIFICANT CHANGE UP (ref 0.4–2)
BUN SERPL-MCNC: 34 MG/DL — HIGH (ref 8–20)
CALCIUM SERPL-MCNC: 9.4 MG/DL — SIGNIFICANT CHANGE UP (ref 8.6–10.2)
CHLORIDE SERPL-SCNC: 97 MMOL/L — LOW (ref 98–107)
CO2 SERPL-SCNC: 26 MMOL/L — SIGNIFICANT CHANGE UP (ref 22–29)
CREAT SERPL-MCNC: 0.89 MG/DL — SIGNIFICANT CHANGE UP (ref 0.5–1.3)
GLUCOSE SERPL-MCNC: 159 MG/DL — HIGH (ref 70–99)
LIDOCAIN IGE QN: 52 U/L — HIGH (ref 22–51)
POTASSIUM SERPL-MCNC: 4.4 MMOL/L — SIGNIFICANT CHANGE UP (ref 3.5–5.3)
POTASSIUM SERPL-SCNC: 4.4 MMOL/L — SIGNIFICANT CHANGE UP (ref 3.5–5.3)
PROT SERPL-MCNC: 7.5 G/DL — SIGNIFICANT CHANGE UP (ref 6.6–8.7)
SODIUM SERPL-SCNC: 137 MMOL/L — SIGNIFICANT CHANGE UP (ref 135–145)

## 2021-04-08 PROCEDURE — 85610 PROTHROMBIN TIME: CPT

## 2021-04-08 PROCEDURE — 96375 TX/PRO/DX INJ NEW DRUG ADDON: CPT

## 2021-04-08 PROCEDURE — 74177 CT ABD & PELVIS W/CONTRAST: CPT | Mod: 26,MG

## 2021-04-08 PROCEDURE — 93005 ELECTROCARDIOGRAM TRACING: CPT

## 2021-04-08 PROCEDURE — 85730 THROMBOPLASTIN TIME PARTIAL: CPT

## 2021-04-08 PROCEDURE — 99284 EMERGENCY DEPT VISIT MOD MDM: CPT | Mod: 25

## 2021-04-08 PROCEDURE — 96374 THER/PROPH/DIAG INJ IV PUSH: CPT | Mod: XU

## 2021-04-08 PROCEDURE — 36415 COLL VENOUS BLD VENIPUNCTURE: CPT

## 2021-04-08 PROCEDURE — 71045 X-RAY EXAM CHEST 1 VIEW: CPT

## 2021-04-08 PROCEDURE — 85025 COMPLETE CBC W/AUTO DIFF WBC: CPT

## 2021-04-08 PROCEDURE — 83690 ASSAY OF LIPASE: CPT

## 2021-04-08 PROCEDURE — G1004: CPT

## 2021-04-08 PROCEDURE — 80053 COMPREHEN METABOLIC PANEL: CPT

## 2021-04-08 PROCEDURE — 74177 CT ABD & PELVIS W/CONTRAST: CPT

## 2021-04-08 PROCEDURE — 83605 ASSAY OF LACTIC ACID: CPT

## 2021-04-08 PROCEDURE — 87040 BLOOD CULTURE FOR BACTERIA: CPT

## 2021-04-12 ENCOUNTER — NON-APPOINTMENT (OUTPATIENT)
Age: 56
End: 2021-04-12

## 2021-04-13 LAB
CULTURE RESULTS: SIGNIFICANT CHANGE UP
CULTURE RESULTS: SIGNIFICANT CHANGE UP
SPECIMEN SOURCE: SIGNIFICANT CHANGE UP
SPECIMEN SOURCE: SIGNIFICANT CHANGE UP

## 2021-04-14 ENCOUNTER — APPOINTMENT (OUTPATIENT)
Dept: COLORECTAL SURGERY | Facility: CLINIC | Age: 56
End: 2021-04-14
Payer: COMMERCIAL

## 2021-04-14 VITALS
HEIGHT: 67 IN | OXYGEN SATURATION: 96 % | WEIGHT: 176 LBS | TEMPERATURE: 97.9 F | RESPIRATION RATE: 16 BRPM | HEART RATE: 86 BPM | SYSTOLIC BLOOD PRESSURE: 133 MMHG | DIASTOLIC BLOOD PRESSURE: 82 MMHG | BODY MASS INDEX: 27.62 KG/M2

## 2021-04-14 DIAGNOSIS — F17.200 NICOTINE DEPENDENCE, UNSPECIFIED, UNCOMPLICATED: ICD-10-CM

## 2021-04-14 PROCEDURE — 99214 OFFICE O/P EST MOD 30 MIN: CPT

## 2021-04-14 PROCEDURE — 99072 ADDL SUPL MATRL&STAF TM PHE: CPT

## 2021-04-14 RX ORDER — ALBUTEROL SULFATE 90 UG/1
108 (90 BASE) AEROSOL, METERED RESPIRATORY (INHALATION)
Refills: 0 | Status: COMPLETED | COMMUNITY
End: 2021-04-14

## 2021-04-14 RX ORDER — MONTELUKAST SODIUM 10 MG/1
10 TABLET, FILM COATED ORAL
Refills: 0 | Status: COMPLETED | COMMUNITY
End: 2021-04-14

## 2021-04-14 RX ORDER — FLUTICASONE FUROATE AND VILANTEROL TRIFENATATE 50; 25 UG/1; UG/1
POWDER RESPIRATORY (INHALATION)
Refills: 0 | Status: COMPLETED | COMMUNITY
End: 2021-04-14

## 2021-04-14 NOTE — ASSESSMENT
[FreeTextEntry1] : Mr. De Anda presents to the office for discussion of colostomy reversal after undergoing urgent Watson's procedure in March 2020 for perforated sigmoid diverticulitis.  He had a colonoscopy recently which did not reveal any polyps, masses or mucosal disease that would otherwise prohibit colostomy reversal. \par \par I also reviewed with him the three potential outcomes of colostomy reversal:\par 1) Inability to reverse the colostomy because of a frozen pelvis\par 2 ) ability to reverse the colostomy, but with an associated low pelvic anastomosis which would require a diverting ileostomy to avoid consequences of an increased chance of anastomotic leak with pelvic sepsis\par 3) reversal of the colostomy \par He is also aware of the need to perform an antibiotic and mechanical bowel prep. Duration of procedure, length of hospital stay, period of convalescence including limitations of activity were all reviewed.   All questions were answered to his satisfaction.  We will likely plan for February 22, 2021.\par Preoperatively, I will obtain a CT of the abdomen and pelvis to better appreciate his anatomy in anticipation of surgical planning.  He understands, and is agreeable.\par \par 4/14/21 Mr. De Anda presents to the office for follow-up in anticipation of colostomy reversal.  His hemoglobin A1c has gradually been improving with the help of an adjustment in dietary habits as well as an increase in physical activity.  He is scheduled to follow-up with his PCP and endocrinologist in the next 2 weeks.  We will tentatively place him back on the schedule for colostomy reversal for the end of April.  He is aware that if his hemoglobin A1c is not within acceptable limits, we will have to delay his surgery again.  The potential outcomes for colostomy reversal were once again reviewed.  All questions answered to his satisfaction.

## 2021-04-14 NOTE — HISTORY OF PRESENT ILLNESS
[FreeTextEntry1] : Mr. De Anda presents to the office for consultation. In 3/29/2020, he underwent urgent Hartmanns procedure at [facility] for severe sigmoid diverticulitis.  By report, his hospital course was complicated by intra-abdominal abscess which was percutaneously drained.  He has since undergone screening colonoscopy with Dr. Varner in December/2020 and is eager for colostomy reversal.  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 De Anda returns to the office for follow-up.  His originally scheduled colostomy reversal in February had to be delayed secondary to elevated hemoglobin A1c of 11.8.  Since that time, he reports having improved his dietary choices and has been following with an endocrinologist.  Last hemoglobin A1c 3 weeks prior was noted to be 8.  He has another follow-up in 2 weeks time.  He also reports increased physical activity as well as weight loss.  He has not been smoking since 1 year prior and is not on any inhalers or nebulizers.  Room air sats are approximately 96% from 89% when he was actively smoking.

## 2021-04-14 NOTE — PHYSICAL EXAM
[No Rash or Lesion] : No rash or lesion [Alert] : alert [Oriented to Person] : oriented to person [Oriented to Place] : oriented to place [Oriented to Time] : oriented to time [Calm] : calm [de-identified] : large midline incision, LLQ colostomy with appreciable though reducible hernia, soft, NTND [de-identified] : No apparent distress [de-identified] : Normocephalic atraumatic [de-identified] : Moving all extremities x4

## 2021-05-04 ENCOUNTER — OUTPATIENT (OUTPATIENT)
Dept: OUTPATIENT SERVICES | Facility: HOSPITAL | Age: 56
LOS: 1 days | End: 2021-05-04
Payer: COMMERCIAL

## 2021-05-04 VITALS
RESPIRATION RATE: 16 BRPM | DIASTOLIC BLOOD PRESSURE: 70 MMHG | HEIGHT: 67 IN | SYSTOLIC BLOOD PRESSURE: 120 MMHG | HEART RATE: 73 BPM | TEMPERATURE: 97 F | WEIGHT: 173.5 LBS

## 2021-05-04 DIAGNOSIS — Z98.890 OTHER SPECIFIED POSTPROCEDURAL STATES: Chronic | ICD-10-CM

## 2021-05-04 DIAGNOSIS — J45.909 UNSPECIFIED ASTHMA, UNCOMPLICATED: ICD-10-CM

## 2021-05-04 DIAGNOSIS — Z90.5 ACQUIRED ABSENCE OF KIDNEY: Chronic | ICD-10-CM

## 2021-05-04 DIAGNOSIS — Z93.3 COLOSTOMY STATUS: Chronic | ICD-10-CM

## 2021-05-04 DIAGNOSIS — K40.90 UNILATERAL INGUINAL HERNIA, WITHOUT OBSTRUCTION OR GANGRENE, NOT SPECIFIED AS RECURRENT: Chronic | ICD-10-CM

## 2021-05-04 DIAGNOSIS — Z43.3 ENCOUNTER FOR ATTENTION TO COLOSTOMY: ICD-10-CM

## 2021-05-04 DIAGNOSIS — Z29.9 ENCOUNTER FOR PROPHYLACTIC MEASURES, UNSPECIFIED: ICD-10-CM

## 2021-05-04 DIAGNOSIS — Z01.818 ENCOUNTER FOR OTHER PREPROCEDURAL EXAMINATION: ICD-10-CM

## 2021-05-04 DIAGNOSIS — E78.5 HYPERLIPIDEMIA, UNSPECIFIED: ICD-10-CM

## 2021-05-04 DIAGNOSIS — E11.9 TYPE 2 DIABETES MELLITUS WITHOUT COMPLICATIONS: ICD-10-CM

## 2021-05-04 LAB
A1C WITH ESTIMATED AVERAGE GLUCOSE RESULT: 7.5 % — HIGH (ref 4–5.6)
ALBUMIN SERPL ELPH-MCNC: 4 G/DL — SIGNIFICANT CHANGE UP (ref 3.3–5.2)
ALP SERPL-CCNC: 99 U/L — SIGNIFICANT CHANGE UP (ref 40–120)
ALT FLD-CCNC: 21 U/L — SIGNIFICANT CHANGE UP
ANION GAP SERPL CALC-SCNC: 13 MMOL/L — SIGNIFICANT CHANGE UP (ref 5–17)
AST SERPL-CCNC: 24 U/L — SIGNIFICANT CHANGE UP
BILIRUB SERPL-MCNC: 0.3 MG/DL — LOW (ref 0.4–2)
BLD GP AB SCN SERPL QL: SIGNIFICANT CHANGE UP
BUN SERPL-MCNC: 21 MG/DL — HIGH (ref 8–20)
CALCIUM SERPL-MCNC: 9.1 MG/DL — SIGNIFICANT CHANGE UP (ref 8.6–10.2)
CHLORIDE SERPL-SCNC: 101 MMOL/L — SIGNIFICANT CHANGE UP (ref 98–107)
CO2 SERPL-SCNC: 26 MMOL/L — SIGNIFICANT CHANGE UP (ref 22–29)
CREAT SERPL-MCNC: 0.89 MG/DL — SIGNIFICANT CHANGE UP (ref 0.5–1.3)
ESTIMATED AVERAGE GLUCOSE: 169 MG/DL — HIGH (ref 68–114)
GLUCOSE SERPL-MCNC: 120 MG/DL — HIGH (ref 70–99)
HCT VFR BLD CALC: 48.9 % — SIGNIFICANT CHANGE UP (ref 39–50)
HGB BLD-MCNC: 15.9 G/DL — SIGNIFICANT CHANGE UP (ref 13–17)
MCHC RBC-ENTMCNC: 28.5 PG — SIGNIFICANT CHANGE UP (ref 27–34)
MCHC RBC-ENTMCNC: 32.5 GM/DL — SIGNIFICANT CHANGE UP (ref 32–36)
MCV RBC AUTO: 87.6 FL — SIGNIFICANT CHANGE UP (ref 80–100)
PLATELET # BLD AUTO: 219 K/UL — SIGNIFICANT CHANGE UP (ref 150–400)
POTASSIUM SERPL-MCNC: 4.4 MMOL/L — SIGNIFICANT CHANGE UP (ref 3.5–5.3)
POTASSIUM SERPL-SCNC: 4.4 MMOL/L — SIGNIFICANT CHANGE UP (ref 3.5–5.3)
PROT SERPL-MCNC: 6.9 G/DL — SIGNIFICANT CHANGE UP (ref 6.6–8.7)
RBC # BLD: 5.58 M/UL — SIGNIFICANT CHANGE UP (ref 4.2–5.8)
RBC # FLD: 13.2 % — SIGNIFICANT CHANGE UP (ref 10.3–14.5)
SODIUM SERPL-SCNC: 140 MMOL/L — SIGNIFICANT CHANGE UP (ref 135–145)
WBC # BLD: 8.7 K/UL — SIGNIFICANT CHANGE UP (ref 3.8–10.5)
WBC # FLD AUTO: 8.7 K/UL — SIGNIFICANT CHANGE UP (ref 3.8–10.5)

## 2021-05-04 PROCEDURE — G0463: CPT

## 2021-05-04 RX ORDER — RANITIDINE HYDROCHLORIDE 150 MG/1
1 TABLET, FILM COATED ORAL
Qty: 0 | Refills: 0 | DISCHARGE

## 2021-05-04 RX ORDER — FLUTICASONE FUROATE AND VILANTEROL TRIFENATATE 100; 25 UG/1; UG/1
1 POWDER RESPIRATORY (INHALATION)
Qty: 0 | Refills: 0 | DISCHARGE

## 2021-05-04 RX ORDER — EMPAGLIFLOZIN 10 MG/1
1 TABLET, FILM COATED ORAL
Qty: 0 | Refills: 0 | DISCHARGE

## 2021-05-04 NOTE — H&P PST ADULT - LAB RESULTS AND INTERPRETATION
hgb a1c 7.5 ( was 11.8 in 2/2021) known diabetic on meds. fingerstick on admit  results faxed to Dr. Lujan. K damico Np

## 2021-05-04 NOTE — H&P PST ADULT - HISTORY OF PRESENT ILLNESS
56 year old male present for PSt for upcoming colostomy reversal.  On 3/29/2020, he underwent urgent Hartmanns procedure at [facility] for severe sigmoid diverticulitis. By report, his hospital course was complicated by intra-abdominal abscess which was percutaneously drained. He has since undergone screening colonoscopy with Dr. Varner in December/2020 and is eager for colostomy reversal. His is schedule for open colostomy reversal, possible ileostomy any related procedures with Dr. Aguila on 5/24/21, he was scheduled for March 2021 but was cancelled due to high A1C 11.8, he said he is been mindful of what he is eating and lost 14 lbs already.

## 2021-05-04 NOTE — H&P PST ADULT - NSICDXPROBLEM_GEN_ALL_CORE_FT
PROBLEM DIAGNOSES  Problem: Encounter for attention to colostomy  Assessment and Plan: open colostomy reversal, possible ileostomy any related procedures with Dr. Aguila on 5/24/21. Medical Clearance pending     Problem: Asthma  Assessment and Plan: Medical Clearance pending     Problem: Diabetes mellitus  Assessment and Plan: Asked the patient not to take DM meds on the DOP. finger stick on DOP     Problem: Hyperlipidemia  Assessment and Plan: continue medications as instructed.     Problem: Need for prophylactic measure  Assessment and Plan: Caprini Score 4 Moderate Risk, surgical team should consider VTE prophylaxis

## 2021-05-04 NOTE — H&P PST ADULT - GASTROINTESTINAL COMMENTS
noted colostomy bag on the left side of the abdomen, draining soft stool, noted a incisional/ventral hernia colostomy, incision hernia

## 2021-05-04 NOTE — H&P PST ADULT - ASSESSMENT
56 year old male present for PSt for upcoming colostomy reversal.  On 3/29/2020, he underwent urgent Hartmanns procedure at [facility] for severe sigmoid diverticulitis. By report, his hospital course was complicated by intra-abdominal abscess which was percutaneously drained. He has since undergone screening colonoscopy with Dr. Varner in December/2020 and is eager for colostomy reversal. His is schedule for open colostomy reversal, possible ileostomy any related procedures with Dr. Aguila on 5/24/21, he was scheduled for March 2021 but was cancelled due to high A1C 11.8, he said he is been mindful of what he is eating and lost 14 lbs already.    56 year old male present for PSt for upcoming colostomy reversal.  On 3/29/2020, he underwent urgent Hartmanns procedure at [facility] for severe sigmoid diverticulitis. By report, his hospital course was complicated by intra-abdominal abscess which was percutaneously drained. He has since undergone screening colonoscopy with Dr. Varner in 2020 and is eager for colostomy reversal. His is schedule for open colostomy reversal, possible ileostomy any related procedures with Dr. Aguila on 21, he was scheduled for 2021 but was cancelled due to high A1C 11.8, he said he is been mindful of what he is eating and lost 14 lbs already.   medications reviewed, instructions given on what medications to take and what not to take. Asked the patient to take the Blood pressure medication/ heart medication on DOP. Asked the pt not to take DM meds on the DOP Asked the pt not to take any NSAID's 5-7 days before surgery and told the pt Tylenol is okay to take for pain, pt verbalized understanding. Covid test on 21. Colon prep as directed by Dr. Aguila's office. ERP teaching given, clear Ensure given to the patient, pt's A1C was really high last time and he is multiple drugs for DM, told him if his A1C from today is high do not consume it on DOP instead take a bottle of getorade , patient agreed. confirmed with patient about pre op Antibiotics to be taken day before surgery.     CAPRINI VTE 2.0 SCORE [CLOT updated 2019]    AGE RELATED RISK FACTORS                                                       MOBILITY RELATED FACTORS  [x ] Age 41-60 years                                            (1 Point)                    [ ] Bed rest                                                        (1 Point)  [ ] Age: 61-74 years                                           (2 Points)                  [ ] Plaster cast                                                   (2 Points)  [ ] Age= 75 years                                              (3 Points)                    [ ] Bed bound for more than 72 hours                 (2 Points)    DISEASE RELATED RISK FACTORS                                               GENDER SPECIFIC FACTORS  [ ] Edema in the lower extremities                       (1 Point)              [ ] Pregnancy                                                     (1 Point)  [ ] Varicose veins                                               (1 Point)                     [ ] Post-partum < 6 weeks                                   (1 Point)             [x ] BMI > 25 Kg/m2                                            (1 Point)                     [ ] Hormonal therapy  or oral contraception          (1 Point)                 [ ] Sepsis (in the previous month)                        (1 Point)               [ ] History of pregnancy complications                 (1 point)  [ ] Pneumonia or serious lung disease                                               [ ] Unexplained or recurrent                     (1 Point)           (in the previous month)                               (1 Point)  [ ] Abnormal pulmonary function test                     (1 Point)                 SURGERY RELATED RISK FACTORS  [ ] Acute myocardial infarction                              (1 Point)               [ ]  Section                                             (1 Point)  [ ] Congestive heart failure (in the previous month)  (1 Point)      [ ] Minor surgery                                                  (1 Point)   [ ] Inflammatory bowel disease                             (1 Point)               [ ] Arthroscopic surgery                                        (2 Points)  [ ] Central venous access                                      (2 Points)                [x ] General surgery lasting more than 45 minutes (2 points)  [ ] Malignancy- Present or previous                   (2 Points)                [ ] Elective arthroplasty                                         (5 points)    [ ] Stroke (in the previous month)                          (5 Points)                                                                                                                                                           HEMATOLOGY RELATED FACTORS                                                 TRAUMA RELATED RISK FACTORS  [ ] Prior episodes of VTE                                     (3 Points)                [ ] Fracture of the hip, pelvis, or leg                       (5 Points)  [ ] Positive family history for VTE                         (3 Points)             [ ] Acute spinal cord injury (in the previous month)  (5 Points)  [ ] Prothrombin 35847 A                                     (3 Points)               [ ] Paralysis  (less than 1 month)                             (5 Points)  [ ] Factor V Leiden                                             (3 Points)                  [ ] Multiple Trauma within 1 month                        (5 Points)  [ ] Lupus anticoagulants                                     (3 Points)                                                           [ ] Anticardiolipin antibodies                               (3 Points)                                                       [ ] High homocysteine in the blood                      (3 Points)                                             [ ] Other congenital or acquired thrombophilia      (3 Points)                                                [ ] Heparin induced thrombocytopenia                  (3 Points)                                     Total Score [   4       ]    OPIOID RISK TOOL    SCOTT EACH BOX THAT APPLIES AND ADD TOTALS AT THE END    FAMILY HISTORY OF SUBSTANCE ABUSE                 FEMALE         MALE                                                Alcohol                             [  ]1 pt          [  x3pts                                               Illegal Drugs                     [  ]2 pts        [  ]3pts                                               Rx Drugs                           [  ]4 pts        [  ]4 pts    PERSONAL HISTORY OF SUBSTANCE ABUSE                                                                                          Alcohol                             [  ]3 pts       [  ]3 pts                                               Illegal Drugs                     [  ]4 pts        [  ]4 pts                                               Rx Drugs                           [  ]5 pts        [  ]5 pts    AGE BETWEEN 16-45 YEARS                                      [  ]1 pt         [  ]1 pt    HISTORY OF PREADOLESCENT   SEXUAL ABUSE                                                             [  ]3 pts        [  ]0pts    PSYCHOLOGICAL DISEASE                     ADD, OCD, Bipolar, Schizophrenia        [  ]2 pts         [  ]2 pts                      Depression                                               [  ]1 pt           [  ]1 pt           SCORING TOTAL   (add numbers and type here)              ( 3)                                     A score of 3 or lower indicated LOW risk for future opioid abuse  A score of 4 to 7 indicated moderate risk for future opioid abuse  A score of 8 or higher indicates a high risk for opioid abuse

## 2021-05-04 NOTE — PATIENT PROFILE ADULT - NSPROHMSYMPCOND_GEN_A_NUR
NP, instructed pt on pre-op instructions/teaching, tips for safer surgery, pain management scale, pre-surgical infection prevention instructions, covid swab appt info, diabetes club info given. Pt verbalized understanding of all instructions given./none

## 2021-05-04 NOTE — H&P PST ADULT - NSICDXPASTMEDICALHX_GEN_ALL_CORE_FT
PAST MEDICAL HISTORY:  Asthma     Current smoker     Diabetes mellitus type 2    GERD (gastroesophageal reflux disease)     History of colon resection     History of diverticulitis     Hyperlipidemia     Lung nodule Two small solid nodules (0.5cm) are noted in the right upper and left lower lobes.    Sleep apnea resloved after wieght loss

## 2021-05-21 ENCOUNTER — APPOINTMENT (OUTPATIENT)
Dept: DISASTER EMERGENCY | Facility: CLINIC | Age: 56
End: 2021-05-21

## 2021-06-11 ENCOUNTER — NON-APPOINTMENT (OUTPATIENT)
Age: 56
End: 2021-06-11

## 2021-06-26 ENCOUNTER — INPATIENT (INPATIENT)
Facility: HOSPITAL | Age: 56
LOS: 4 days | Discharge: HOME | DRG: 333 | End: 2021-07-01
Attending: COLON & RECTAL SURGERY | Admitting: COLON & RECTAL SURGERY
Payer: COMMERCIAL

## 2021-06-26 VITALS
HEIGHT: 67 IN | DIASTOLIC BLOOD PRESSURE: 88 MMHG | TEMPERATURE: 99 F | HEART RATE: 58 BPM | RESPIRATION RATE: 20 BRPM | WEIGHT: 169.98 LBS | OXYGEN SATURATION: 99 % | SYSTOLIC BLOOD PRESSURE: 159 MMHG

## 2021-06-26 DIAGNOSIS — Z98.890 OTHER SPECIFIED POSTPROCEDURAL STATES: Chronic | ICD-10-CM

## 2021-06-26 DIAGNOSIS — R10.9 UNSPECIFIED ABDOMINAL PAIN: ICD-10-CM

## 2021-06-26 DIAGNOSIS — Z90.5 ACQUIRED ABSENCE OF KIDNEY: Chronic | ICD-10-CM

## 2021-06-26 DIAGNOSIS — Z93.3 COLOSTOMY STATUS: Chronic | ICD-10-CM

## 2021-06-26 DIAGNOSIS — K40.90 UNILATERAL INGUINAL HERNIA, WITHOUT OBSTRUCTION OR GANGRENE, NOT SPECIFIED AS RECURRENT: Chronic | ICD-10-CM

## 2021-06-26 LAB
ALBUMIN SERPL ELPH-MCNC: 4.2 G/DL — SIGNIFICANT CHANGE UP (ref 3.3–5.2)
ALP SERPL-CCNC: 89 U/L — SIGNIFICANT CHANGE UP (ref 40–120)
ALT FLD-CCNC: 22 U/L — SIGNIFICANT CHANGE UP
ANION GAP SERPL CALC-SCNC: 9 MMOL/L — SIGNIFICANT CHANGE UP (ref 5–17)
AST SERPL-CCNC: 20 U/L — SIGNIFICANT CHANGE UP
BASOPHILS # BLD AUTO: 0.08 K/UL — SIGNIFICANT CHANGE UP (ref 0–0.2)
BASOPHILS NFR BLD AUTO: 1 % — SIGNIFICANT CHANGE UP (ref 0–2)
BILIRUB SERPL-MCNC: 0.4 MG/DL — SIGNIFICANT CHANGE UP (ref 0.4–2)
BUN SERPL-MCNC: 25.5 MG/DL — HIGH (ref 8–20)
CALCIUM SERPL-MCNC: 9.4 MG/DL — SIGNIFICANT CHANGE UP (ref 8.6–10.2)
CHLORIDE SERPL-SCNC: 102 MMOL/L — SIGNIFICANT CHANGE UP (ref 98–107)
CO2 SERPL-SCNC: 26 MMOL/L — SIGNIFICANT CHANGE UP (ref 22–29)
CREAT SERPL-MCNC: 0.81 MG/DL — SIGNIFICANT CHANGE UP (ref 0.5–1.3)
EOSINOPHIL # BLD AUTO: 0.26 K/UL — SIGNIFICANT CHANGE UP (ref 0–0.5)
EOSINOPHIL NFR BLD AUTO: 3.3 % — SIGNIFICANT CHANGE UP (ref 0–6)
GLUCOSE BLDC GLUCOMTR-MCNC: 220 MG/DL — HIGH (ref 70–99)
GLUCOSE BLDC GLUCOMTR-MCNC: 92 MG/DL — SIGNIFICANT CHANGE UP (ref 70–99)
GLUCOSE SERPL-MCNC: 134 MG/DL — HIGH (ref 70–99)
HCT VFR BLD CALC: 47.4 % — SIGNIFICANT CHANGE UP (ref 39–50)
HGB BLD-MCNC: 15.9 G/DL — SIGNIFICANT CHANGE UP (ref 13–17)
IMM GRANULOCYTES NFR BLD AUTO: 0.3 % — SIGNIFICANT CHANGE UP (ref 0–1.5)
LACTATE BLDV-MCNC: 1.4 MMOL/L — SIGNIFICANT CHANGE UP (ref 0.5–2)
LIDOCAIN IGE QN: 984 U/L — HIGH (ref 22–51)
LYMPHOCYTES # BLD AUTO: 2.87 K/UL — SIGNIFICANT CHANGE UP (ref 1–3.3)
LYMPHOCYTES # BLD AUTO: 36.1 % — SIGNIFICANT CHANGE UP (ref 13–44)
MCHC RBC-ENTMCNC: 28.7 PG — SIGNIFICANT CHANGE UP (ref 27–34)
MCHC RBC-ENTMCNC: 33.5 GM/DL — SIGNIFICANT CHANGE UP (ref 32–36)
MCV RBC AUTO: 85.6 FL — SIGNIFICANT CHANGE UP (ref 80–100)
MONOCYTES # BLD AUTO: 0.68 K/UL — SIGNIFICANT CHANGE UP (ref 0–0.9)
MONOCYTES NFR BLD AUTO: 8.5 % — SIGNIFICANT CHANGE UP (ref 2–14)
NEUTROPHILS # BLD AUTO: 4.05 K/UL — SIGNIFICANT CHANGE UP (ref 1.8–7.4)
NEUTROPHILS NFR BLD AUTO: 50.8 % — SIGNIFICANT CHANGE UP (ref 43–77)
PLATELET # BLD AUTO: 199 K/UL — SIGNIFICANT CHANGE UP (ref 150–400)
POTASSIUM SERPL-MCNC: 4.9 MMOL/L — SIGNIFICANT CHANGE UP (ref 3.5–5.3)
POTASSIUM SERPL-SCNC: 4.9 MMOL/L — SIGNIFICANT CHANGE UP (ref 3.5–5.3)
PROT SERPL-MCNC: 6.9 G/DL — SIGNIFICANT CHANGE UP (ref 6.6–8.7)
RBC # BLD: 5.54 M/UL — SIGNIFICANT CHANGE UP (ref 4.2–5.8)
RBC # FLD: 13.6 % — SIGNIFICANT CHANGE UP (ref 10.3–14.5)
SARS-COV-2 RNA SPEC QL NAA+PROBE: SIGNIFICANT CHANGE UP
SODIUM SERPL-SCNC: 137 MMOL/L — SIGNIFICANT CHANGE UP (ref 135–145)
WBC # BLD: 7.96 K/UL — SIGNIFICANT CHANGE UP (ref 3.8–10.5)
WBC # FLD AUTO: 7.96 K/UL — SIGNIFICANT CHANGE UP (ref 3.8–10.5)

## 2021-06-26 PROCEDURE — 99222 1ST HOSP IP/OBS MODERATE 55: CPT

## 2021-06-26 PROCEDURE — 74177 CT ABD & PELVIS W/CONTRAST: CPT | Mod: 26,ME

## 2021-06-26 PROCEDURE — G1004: CPT

## 2021-06-26 PROCEDURE — 93010 ELECTROCARDIOGRAM REPORT: CPT

## 2021-06-26 PROCEDURE — 99285 EMERGENCY DEPT VISIT HI MDM: CPT

## 2021-06-26 RX ORDER — SODIUM CHLORIDE 9 MG/ML
1000 INJECTION, SOLUTION INTRAVENOUS
Refills: 0 | Status: DISCONTINUED | OUTPATIENT
Start: 2021-06-26 | End: 2021-06-28

## 2021-06-26 RX ORDER — SODIUM CHLORIDE 9 MG/ML
1000 INJECTION INTRAMUSCULAR; INTRAVENOUS; SUBCUTANEOUS ONCE
Refills: 0 | Status: COMPLETED | OUTPATIENT
Start: 2021-06-26 | End: 2021-06-26

## 2021-06-26 RX ORDER — ALBUTEROL 90 UG/1
1.25 AEROSOL, METERED ORAL THREE TIMES A DAY
Refills: 0 | Status: DISCONTINUED | OUTPATIENT
Start: 2021-06-26 | End: 2021-06-28

## 2021-06-26 RX ORDER — DEXTROSE 50 % IN WATER 50 %
25 SYRINGE (ML) INTRAVENOUS ONCE
Refills: 0 | Status: DISCONTINUED | OUTPATIENT
Start: 2021-06-26 | End: 2021-06-28

## 2021-06-26 RX ORDER — ATORVASTATIN CALCIUM 80 MG/1
10 TABLET, FILM COATED ORAL AT BEDTIME
Refills: 0 | Status: DISCONTINUED | OUTPATIENT
Start: 2021-06-26 | End: 2021-06-28

## 2021-06-26 RX ORDER — MORPHINE SULFATE 50 MG/1
4 CAPSULE, EXTENDED RELEASE ORAL ONCE
Refills: 0 | Status: DISCONTINUED | OUTPATIENT
Start: 2021-06-26 | End: 2021-06-26

## 2021-06-26 RX ORDER — GLUCAGON INJECTION, SOLUTION 0.5 MG/.1ML
1 INJECTION, SOLUTION SUBCUTANEOUS ONCE
Refills: 0 | Status: DISCONTINUED | OUTPATIENT
Start: 2021-06-26 | End: 2021-06-28

## 2021-06-26 RX ORDER — ACETAMINOPHEN 500 MG
650 TABLET ORAL EVERY 6 HOURS
Refills: 0 | Status: DISCONTINUED | OUTPATIENT
Start: 2021-06-26 | End: 2021-06-28

## 2021-06-26 RX ORDER — ENOXAPARIN SODIUM 100 MG/ML
40 INJECTION SUBCUTANEOUS EVERY 24 HOURS
Refills: 0 | Status: DISCONTINUED | OUTPATIENT
Start: 2021-06-26 | End: 2021-06-28

## 2021-06-26 RX ORDER — DEXTROSE 50 % IN WATER 50 %
12.5 SYRINGE (ML) INTRAVENOUS ONCE
Refills: 0 | Status: DISCONTINUED | OUTPATIENT
Start: 2021-06-26 | End: 2021-06-28

## 2021-06-26 RX ORDER — DEXTROSE 50 % IN WATER 50 %
15 SYRINGE (ML) INTRAVENOUS ONCE
Refills: 0 | Status: DISCONTINUED | OUTPATIENT
Start: 2021-06-26 | End: 2021-06-28

## 2021-06-26 RX ORDER — INSULIN LISPRO 100/ML
VIAL (ML) SUBCUTANEOUS
Refills: 0 | Status: DISCONTINUED | OUTPATIENT
Start: 2021-06-26 | End: 2021-06-28

## 2021-06-26 RX ORDER — LOSARTAN POTASSIUM 100 MG/1
50 TABLET, FILM COATED ORAL DAILY
Refills: 0 | Status: DISCONTINUED | OUTPATIENT
Start: 2021-06-26 | End: 2021-06-28

## 2021-06-26 RX ORDER — MONTELUKAST 4 MG/1
10 TABLET, CHEWABLE ORAL DAILY
Refills: 0 | Status: DISCONTINUED | OUTPATIENT
Start: 2021-06-26 | End: 2021-06-28

## 2021-06-26 RX ORDER — IOHEXOL 300 MG/ML
30 INJECTION, SOLUTION INTRAVENOUS ONCE
Refills: 0 | Status: COMPLETED | OUTPATIENT
Start: 2021-06-26 | End: 2021-06-26

## 2021-06-26 RX ADMIN — ENOXAPARIN SODIUM 40 MILLIGRAM(S): 100 INJECTION SUBCUTANEOUS at 21:53

## 2021-06-26 RX ADMIN — SODIUM CHLORIDE 75 MILLILITER(S): 9 INJECTION, SOLUTION INTRAVENOUS at 22:56

## 2021-06-26 RX ADMIN — IOHEXOL 30 MILLILITER(S): 300 INJECTION, SOLUTION INTRAVENOUS at 11:06

## 2021-06-26 RX ADMIN — Medication 650 MILLIGRAM(S): at 21:54

## 2021-06-26 RX ADMIN — ATORVASTATIN CALCIUM 10 MILLIGRAM(S): 80 TABLET, FILM COATED ORAL at 21:53

## 2021-06-26 RX ADMIN — MORPHINE SULFATE 4 MILLIGRAM(S): 50 CAPSULE, EXTENDED RELEASE ORAL at 12:46

## 2021-06-26 RX ADMIN — Medication 650 MILLIGRAM(S): at 22:54

## 2021-06-26 RX ADMIN — Medication 4: at 18:49

## 2021-06-26 RX ADMIN — MORPHINE SULFATE 4 MILLIGRAM(S): 50 CAPSULE, EXTENDED RELEASE ORAL at 11:06

## 2021-06-26 RX ADMIN — SODIUM CHLORIDE 1000 MILLILITER(S): 9 INJECTION INTRAMUSCULAR; INTRAVENOUS; SUBCUTANEOUS at 11:06

## 2021-06-26 RX ADMIN — SODIUM CHLORIDE 20 MILLILITER(S): 9 INJECTION, SOLUTION INTRAVENOUS at 16:12

## 2021-06-26 NOTE — ED ADULT TRIAGE NOTE - CHIEF COMPLAINT QUOTE
left lower quadrant intermittent stabbing pain, by site of llq colostomy, denies changes in consistency denies blood in bowel or urine

## 2021-06-26 NOTE — ED STATDOCS - OBJECTIVE STATEMENT
55y/o M with PMHx of Asthma, DM, GERD, Colon resection, Hernia, Diverticulitis, Hyperlipidemia; on Metformin, Losartan, Jardiance presents to the ED c/o abdominal pain, localized at stoma that began this morning. Pt had stoma placed 1 year ago, presented to ED 1 month ago for abdominal pain in same area, had full work up. Pt is currently in process of stoma reversal secondary to insurance, blood sugar and medical clearance, states sugar has been improving. Pt recently changed stoma. Denies nausea or vomiting  Surgeon: Yohana 55y/o M with PMHx of Asthma, DM, GERD, Colon resection, Hernia, Diverticulitis, Hyperlipidemia; on Metformin, Losartan, Jardiance presents to the ED c/o abdominal pain, localized at stoma that began this morning. Sharp in nature; Pt had stoma placed 1 year ago, presented to ED 1 month ago for abdominal pain in same area, had full work up. Pt is currently in process of stoma reversal secondary to insurance, blood sugar and medical clearance, states sugar has been improving. Pt recently changed stoma. Denies nausea or vomiting  Surgeon: Yohana

## 2021-06-26 NOTE — H&P ADULT - NSHPLABSRESULTS_GEN_ALL_CORE
LABS:                         15.9   7.96  )-----------( 199      ( 26 Jun 2021 11:13 )             47.4     06-26    137  |  102  |  25.5<H>  ----------------------------<  134<H>  4.9   |  26.0  |  0.81    Ca    9.4      26 Jun 2021 11:13    TPro  6.9  /  Alb  4.2  /  TBili  0.4  /  DBili  x   /  AST  20  /  ALT  22  /  AlkPhos  89  06-26                  RADIOLOGY, EKG & ADDITIONAL TESTS: Reviewed.   < from: CT Abdomen and Pelvis w/ Oral Cont and w/ IV Cont (06.26.21 @ 12:48) >       EXAM:  CT ABDOMEN AND PELVIS OC IC                          PROCEDURE DATE:  06/26/2021          INTERPRETATION:  CLINICAL INFORMATION: Abdominal pain, acute, nonlocalized LLQ pain, parastomal pain    COMPARISON: 4.8.21.    CONTRAST/COMPLICATIONS:  IV Contrast: Visipaque 320  96 cc administered   4 cc discarded  Oral Contrast: Omnipaque 300 + Fruit 2o  Complications: None reported at time of study completion    PROCEDURE:  CT of the Abdomen and Pelvis was performed.  Sagittal and coronal reformats were performed.    FINDINGS:    LOWER CHEST: Within normal limits.    LIVER: Within normal limits.  BILE DUCTS: Normal caliber.  GALLBLADDER: Within normal limits.  SPLEEN: Within normal limits.  PANCREAS: Unchanged main duct dilatation.  ADRENALS: Within normal limits.  KIDNEYS/URETERS: Absent left kidney.    BLADDER: Within normal limits.  REPRODUCTIVE ORGANS: Within normal limits.    BOWEL: No bowel obstruction. The appendix is normal.  Status post Watson's pouch with descending colostomy.  PERITONEUM: No ascites.  VESSELS:  Within normal limits.  RETROPERITONEUM/LYMPH NODES: No lymphadenopathy.  ABDOMINAL WALL: Small right inguinal hernia. There is unchanged parastomal hernia containing small bowel. Small fat-containing ventral hernias are noted.  BONES: Within normal limits.    IMPRESSION: Unchanged large parastomal hernia containing small bowel. Small fat-containing ventral hernias are noted    < end of copied text >

## 2021-06-26 NOTE — H&P ADULT - NSHPPHYSICALEXAM_GEN_ALL_CORE
PHYSICAL EXAM:  GENERAL: NAD, well-groomed, well-developed  HEAD:  Atraumatic, Normocephalic  EYES: EOMI, PERRLA, conjunctiva and sclera clear  NECK: Supple, No JVD  CHEST/LUNG: Non labored breathing on room air  HEART: Regular rate and rhythm; S1/S2  ABDOMEN: Soft, Nontender, Nondistended; colostomy patent, pink with appropriate output, minimal tenderness to palpation around colostomy site  VASCULAR: Normal pulses, Normal capillary refill  EXTREMITIES:  2+ Peripheral Pulses  SKIN: Warm, Intact  NERVOUS SYSTEM:  A/O x3, CN 2-12 intact, No focal deficits

## 2021-06-26 NOTE — H&P ADULT - NSICDXPASTMEDICALHX_GEN_ALL_CORE_FT
PAST MEDICAL HISTORY:  Asthma     Diabetes mellitus type 2    GERD (gastroesophageal reflux disease)     History of colon resection     History of diverticulitis     Hyperlipidemia     Lung nodule Two small solid nodules (0.5cm) are noted in the right upper and left lower lobes.    Sleep apnea resloved after wieght loss

## 2021-06-26 NOTE — H&P ADULT - ASSESSMENT
55 yo M with colostomy s/p Guanaco's in 2020 presenting with parastomal abdominal pain, no findings of obstruction, or strangulated/incarcerated hernia. elevated lipase >900, persistent abdominal pain around colostomy site likely secondary to parastomal hernia    Plan:  Please admit patient to Colorectal Surgery service under Dr. Aguila  Pain control as needed  AM labs, recheck lipase  Consistent carb diet/LR@42  Add on Monday 6/28 for open colostomy reversal with Dr. Aguila  Obtain medical clearance    Patient discussed with Dr. Aguila    Consult received 12:06pm  Patient evaluated 1:00pm 55 yo M with colostomy s/p Guanaco's in 2020 presenting with parastomal abdominal pain, no findings of obstruction, or strangulated/incarcerated hernia. elevated lipase >900, persistent abdominal pain around colostomy site likely secondary to parastomal hernia    Plan:  Please admit patient to Colorectal Surgery service under Dr. Aguila  Pain control as needed  AM labs, recheck lipase  Consistent carb diet/LR@20  Add on Monday 6/28 for open colostomy reversal with Dr. Aguila  Obtain medical clearance    Patient discussed with Dr. Aguila    Consult received 12:06pm  Patient evaluated 1:00pm

## 2021-06-26 NOTE — ED STATDOCS - ATTENDING CONTRIBUTION TO CARE
I, Lv Osuna, performed the initial face to face bedside interview with this patient regarding history of present illness, review of symptoms and relevant past medical, social and family history.  I completed an independent physical examination.  I was the initial provider who evaluated this patient. I have signed out the follow up of any pending tests (i.e. labs, radiological studies) to the ACP.  I have communicated the patient’s plan of care and disposition with the ACP.

## 2021-06-26 NOTE — ED ADULT NURSE NOTE - OBJECTIVE STATEMENT
Pt with PMHx of colon resection, DM, GERD, diverticulitis, hyperlipidemia presents with LLQ abd pain that began this morning. Pt had stoma placed about a year ago. Pt states pain is specific to the stoma site. Pt presented to ED for similar sympotm one year ago and worked up. Pt denies any N/V/D, fever, chills, sob.

## 2021-06-26 NOTE — H&P ADULT - NSICDXPASTSURGICALHX_GEN_ALL_CORE_FT
PAST SURGICAL HISTORY:  H/O colonoscopy 6/2019    History of endoscopy 6/2019    Inguinal hernia left    S/P arthroscopy left, torn meniscus 25yrs ago    S/p nephrectomy left, congenital defect  9y/o    Status post colostomy

## 2021-06-26 NOTE — H&P ADULT - TIME BILLING
Seen and examined.  Known to me from outpt office visits for issues with colostomy and pain.  Presents to ED with continued complaints.  AFVSS  NAD  soft, NTND, colostomy with parastomal hernia  Labs reviewed  Plan to admit and add on to OR schedule for reversal.

## 2021-06-26 NOTE — CONSULT NOTE ADULT - SUBJECTIVE AND OBJECTIVE BOX
CC: Patient is a 56y old  Male who presents with a chief complaint of abdominal pain    HPI:    PAST MEDICAL & SURGICAL HISTORY:  Hyperlipidemia    Diabetes mellitus  type 2    Asthma    GERD (gastroesophageal reflux disease)    Sleep apnea  resloved after wieght loss    History of diverticulitis    History of colon resection    Lung nodule  Two small solid nodules (0.5cm) are noted in the right upper and left lower lobes.    S/p nephrectomy  left, congenital defect  9y/o    S/P arthroscopy  left, torn meniscus 25yrs ago    Inguinal hernia  left    History of endoscopy  6/2019    H/O colonoscopy  6/2019    Status post colostomy      SOCIAL HISTORY:  Tobacco Usage:  (   ) never smoked   (   ) former smoker   (   ) current smoker  (     ) pack years    Tobacco Quit Date:  Substance Use (Street drugs): (  ) never used  (  ) other:  Alcohol Usage:    Family history reviewed and otherwise non-contributory  ALLERGIES:  Goo Jakub Spray- asthma (Other)  No Known Drug Allergies    MEDICATIONS:    REVIEW OF SYSTEMS:  All other ROS reviewed and negative except as otherwise stated    Vital Signs Last 24 Hrs  T(F): 98.8 (26 Jun 2021 09:58), Max: 98.8 (26 Jun 2021 09:58)  HR: 58 (26 Jun 2021 09:58) (58 - 58)  BP: 159/88 (26 Jun 2021 09:58) (159/88 - 159/88)  RR: 20 (26 Jun 2021 09:58) (20 - 20)  SpO2: 99% (26 Jun 2021 09:58) (99% - 99%)  I&O's Summary    PHYSICAL EXAM:  GENERAL: NAD, well-groomed, well-developed  HEAD:  Atraumatic, Normocephalic  EYES: EOMI, PERRLA, conjunctiva and sclera clear  ENMT: No tonsillar erythema, exudates, or enlargement; Moist mucous membranes, Good dentition  NECK: Supple, No JVD  CHEST/LUNG: Non labored breathing on room air  HEART: Regular rate and rhythm; S1/S2  ABDOMEN: Soft, Nontender, Nondistended; Bowel sounds present  VASCULAR: Normal pulses, Normal capillary refill  EXTREMITIES:  2+ Peripheral Pulses  SKIN: Warm, Intact  NERVOUS SYSTEM:  A/O x3, CN 2-12 intact, No focal deficits    LABS:                        15.9   7.96  )-----------( 199      ( 26 Jun 2021 11:13 )             47.4     06-26    137  |  102  |  25.5  ----------------------------<  134  4.9   |  26.0  |  0.81    Ca    9.4      26 Jun 2021 11:13    TPro  6.9  /  Alb  4.2  /  TBili  0.4  /  DBili  x   /  AST  20  /  ALT  22  /  AlkPhos  89  06-26    eGFR if Non African American: 101 mL/min/1.73M2 (06-26-21 @ 11:13)  eGFR if African American: 117 mL/min/1.73M2 (06-26-21 @ 11:13)                11:13 - VBG - pH:       | pCO2:       | pO2:       | Lactate: 1.40                       RADIOLOGY & ADDITIONAL TESTS:   CC: Patient is a 56y old  Male who presents with a chief complaint of abdominal pain    HPI:  57 yo M with DM, COPD, HTN, CAD presenting with one day of abdominal pain. Patient states it started this morning, described as a sharp, stabbing pain around his colostomy site. He had a similar episode in April in which nothing was found on imaging and he was discharged home. The pain resolved soon after. He has not had changes in colostomy output, empties his bag 3-4 times a day. No changes in consistency of output. Denies fevers, chills, nausea, vomiting.    Of note, he has a history of Guanaco's procedure done 3/2020 for sigmoid diverticulitis at [facility]. Patient met with Dr. Aguila and was originally planned 2/2021 for a colostomy reversal/possible ileostomy,case delayed because of elevated HgbA1c of 11.8. Patient has since changed his eating habits, lost weight, and last HgbA1c was 7.5 (May 4th). He was then re-scheduled for May 25th, but states it was cancelled due to insurance issues.     PAST MEDICAL & SURGICAL HISTORY:  Hyperlipidemia    Diabetes mellitus  type 2    Asthma    GERD (gastroesophageal reflux disease)    Sleep apnea  resloved after wieght loss    History of diverticulitis    History of colon resection    Lung nodule  Two small solid nodules (0.5cm) are noted in the right upper and left lower lobes.    S/p nephrectomy  left, congenital defect  7y/o    S/P arthroscopy  left, torn meniscus 25yrs ago    Inguinal hernia  left    History of endoscopy  6/2019    H/O colonoscopy  6/2019    Status post colostomy      SOCIAL HISTORY:  Quit smoking one year ago    Family history reviewed and otherwise non-contributory    ALLERGIES:  Goo Jakub Spray- asthma (Other)  No Known Drug Allergies    MEDICATIONS:  Jardiance 25 daily  Metformin 500 BID  Losartan 50mg daily  atorvaStatin 10mg daily  Montelukast 10mg daily  Albuterol as needed  Breo Ellipta inhaler      REVIEW OF SYSTEMS:  All other ROS reviewed and negative except as otherwise stated    Vital Signs Last 24 Hrs  T(F): 98.8 (26 Jun 2021 09:58), Max: 98.8 (26 Jun 2021 09:58)  HR: 58 (26 Jun 2021 09:58) (58 - 58)  BP: 159/88 (26 Jun 2021 09:58) (159/88 - 159/88)  RR: 20 (26 Jun 2021 09:58) (20 - 20)  SpO2: 99% (26 Jun 2021 09:58) (99% - 99%)  I&O's Summary    PHYSICAL EXAM:  GENERAL: NAD, well-groomed, well-developed  HEAD:  Atraumatic, Normocephalic  EYES: EOMI, PERRLA, conjunctiva and sclera clear  NECK: Supple, No JVD  CHEST/LUNG: Non labored breathing on room air  HEART: Regular rate and rhythm; S1/S2  ABDOMEN: Soft, Nontender, Nondistended; colostomy patent, pink with appropriate output, minimal tenderness to palpation around colostomy site  VASCULAR: Normal pulses, Normal capillary refill  EXTREMITIES:  2+ Peripheral Pulses  SKIN: Warm, Intact  NERVOUS SYSTEM:  A/O x3, CN 2-12 intact, No focal deficits    LABS:                        15.9   7.96  )-----------( 199      ( 26 Jun 2021 11:13 )             47.4     06-26    137  |  102  |  25.5  ----------------------------<  134  4.9   |  26.0  |  0.81    Ca    9.4      26 Jun 2021 11:13    TPro  6.9  /  Alb  4.2  /  TBili  0.4  /  DBili  x   /  AST  20  /  ALT  22  /  AlkPhos  89  06-26    eGFR if Non African American: 101 mL/min/1.73M2 (06-26-21 @ 11:13)  eGFR if African American: 117 mL/min/1.73M2 (06-26-21 @ 11:13)                11:13 - VBG - pH:       | pCO2:       | pO2:       | Lactate: 1.40                       RADIOLOGY & ADDITIONAL TESTS:  < from: CT Abdomen and Pelvis w/ Oral Cont and w/ IV Cont (06.26.21 @ 12:48) >   EXAM:  CT ABDOMEN AND PELVIS OC IC                          PROCEDURE DATE:  06/26/2021          INTERPRETATION:  CLINICAL INFORMATION: Abdominal pain, acute, nonlocalized LLQ pain, parastomal pain    COMPARISON: 4.8.21.    CONTRAST/COMPLICATIONS:  IV Contrast: Visipaque 320  96 cc administered   4 cc discarded  Oral Contrast: Omnipaque 300 + Fruit 2o  Complications: None reported at time of study completion    PROCEDURE:  CT of the Abdomen and Pelvis was performed.  Sagittal and coronal reformats were performed.    FINDINGS:    LOWER CHEST: Within normal limits.    LIVER: Within normal limits.  BILE DUCTS: Normal caliber.  GALLBLADDER: Within normal limits.  SPLEEN: Within normal limits.  PANCREAS: Unchanged main duct dilatation.  ADRENALS: Within normal limits.  KIDNEYS/URETERS: Absent left kidney.    BLADDER: Within normal limits.  REPRODUCTIVE ORGANS: Within normal limits.    BOWEL: No bowel obstruction. The appendix is normal.  Status post Watson's pouch with descending colostomy.  PERITONEUM: No ascites.  VESSELS:  Within normal limits.  RETROPERITONEUM/LYMPH NODES: No lymphadenopathy.  ABDOMINAL WALL: Small right inguinal hernia. There is unchanged parastomal hernia containing small bowel. Small fat-containing ventral hernias are noted.  BONES: Within normal limits.    IMPRESSION: Unchanged large parastomal hernia containing small bowel. Small fat-containing ventral hernias are noted.    < end of copied text >     CC: Patient is a 56y old  Male who presents with a chief complaint of abdominal pain    HPI:  57 yo M with DM, COPD, HTN, CAD presenting with one day of abdominal pain. Patient states it started this morning, described as a sharp, stabbing pain around his colostomy site. He had a similar episode in April in which nothing was found on imaging and he was discharged home. The pain resolved soon after. He has not had changes in colostomy output, empties his bag 3-4 times a day. No changes in consistency of output. Denies fevers, chills, nausea, vomiting.    Of note, he has a history of Guanaco's procedure done 3/2020 for sigmoid diverticulitis at [facility]. Patient met with Dr. Aguila and was originally planned 2/2021 for a colostomy reversal/possible ileostomy,case delayed because of elevated HgbA1c of 11.8. Patient has since changed his eating habits, lost weight, and last HgbA1c was 7.5 (May 4th).     PAST MEDICAL & SURGICAL HISTORY:  Hyperlipidemia    Diabetes mellitus  type 2    Asthma    GERD (gastroesophageal reflux disease)    Sleep apnea  resloved after wieght loss    History of diverticulitis    History of colon resection    Lung nodule  Two small solid nodules (0.5cm) are noted in the right upper and left lower lobes.    S/p nephrectomy  left, congenital defect  9y/o    S/P arthroscopy  left, torn meniscus 25yrs ago    Inguinal hernia  left    History of endoscopy  6/2019    H/O colonoscopy  6/2019    Status post colostomy      SOCIAL HISTORY:  Quit smoking one year ago    Family history reviewed and otherwise non-contributory    ALLERGIES:  Goo Jakub Spray- asthma (Other)  No Known Drug Allergies    MEDICATIONS:  Jardiance 25 daily  Metformin 500 BID  Losartan 50mg daily  atorvaStatin 10mg daily  Montelukast 10mg daily  Albuterol as needed  Breo Ellipta inhaler      REVIEW OF SYSTEMS:  All other ROS reviewed and negative except as otherwise stated    Vital Signs Last 24 Hrs  T(F): 98.8 (26 Jun 2021 09:58), Max: 98.8 (26 Jun 2021 09:58)  HR: 58 (26 Jun 2021 09:58) (58 - 58)  BP: 159/88 (26 Jun 2021 09:58) (159/88 - 159/88)  RR: 20 (26 Jun 2021 09:58) (20 - 20)  SpO2: 99% (26 Jun 2021 09:58) (99% - 99%)  I&O's Summary    PHYSICAL EXAM:  GENERAL: NAD, well-groomed, well-developed  HEAD:  Atraumatic, Normocephalic  EYES: EOMI, PERRLA, conjunctiva and sclera clear  NECK: Supple, No JVD  CHEST/LUNG: Non labored breathing on room air  HEART: Regular rate and rhythm; S1/S2  ABDOMEN: Soft, Nontender, Nondistended; colostomy patent, pink with appropriate output, minimal tenderness to palpation around colostomy site  VASCULAR: Normal pulses, Normal capillary refill  EXTREMITIES:  2+ Peripheral Pulses  SKIN: Warm, Intact  NERVOUS SYSTEM:  A/O x3, CN 2-12 intact, No focal deficits    LABS:                        15.9   7.96  )-----------( 199      ( 26 Jun 2021 11:13 )             47.4     06-26    137  |  102  |  25.5  ----------------------------<  134  4.9   |  26.0  |  0.81    Ca    9.4      26 Jun 2021 11:13    TPro  6.9  /  Alb  4.2  /  TBili  0.4  /  DBili  x   /  AST  20  /  ALT  22  /  AlkPhos  89  06-26    eGFR if Non African American: 101 mL/min/1.73M2 (06-26-21 @ 11:13)  eGFR if African American: 117 mL/min/1.73M2 (06-26-21 @ 11:13)                11:13 - VBG - pH:       | pCO2:       | pO2:       | Lactate: 1.40                       RADIOLOGY & ADDITIONAL TESTS:  < from: CT Abdomen and Pelvis w/ Oral Cont and w/ IV Cont (06.26.21 @ 12:48) >   EXAM:  CT ABDOMEN AND PELVIS OC IC                          PROCEDURE DATE:  06/26/2021          INTERPRETATION:  CLINICAL INFORMATION: Abdominal pain, acute, nonlocalized LLQ pain, parastomal pain    COMPARISON: 4.8.21.    CONTRAST/COMPLICATIONS:  IV Contrast: Visipaque 320  96 cc administered   4 cc discarded  Oral Contrast: Omnipaque 300 + Fruit 2o  Complications: None reported at time of study completion    PROCEDURE:  CT of the Abdomen and Pelvis was performed.  Sagittal and coronal reformats were performed.    FINDINGS:    LOWER CHEST: Within normal limits.    LIVER: Within normal limits.  BILE DUCTS: Normal caliber.  GALLBLADDER: Within normal limits.  SPLEEN: Within normal limits.  PANCREAS: Unchanged main duct dilatation.  ADRENALS: Within normal limits.  KIDNEYS/URETERS: Absent left kidney.    BLADDER: Within normal limits.  REPRODUCTIVE ORGANS: Within normal limits.    BOWEL: No bowel obstruction. The appendix is normal.  Status post Watson's pouch with descending colostomy.  PERITONEUM: No ascites.  VESSELS:  Within normal limits.  RETROPERITONEUM/LYMPH NODES: No lymphadenopathy.  ABDOMINAL WALL: Small right inguinal hernia. There is unchanged parastomal hernia containing small bowel. Small fat-containing ventral hernias are noted.  BONES: Within normal limits.    IMPRESSION: Unchanged large parastomal hernia containing small bowel. Small fat-containing ventral hernias are noted.    < end of copied text >

## 2021-06-26 NOTE — ED STATDOCS - PM[facility]
Asthma    Diabetes mellitus  type 2  GERD (gastroesophageal reflux disease)    History of colon resection    History of diverticulitis    Hyperlipidemia    Lung nodule  Two small solid nodules (0.5cm) are noted in the right upper and left lower lobes.  Sleep apnea  resloved after wieght loss

## 2021-06-26 NOTE — CONSULT NOTE ADULT - ASSESSMENT
57 yo M with colostomy s/p Guanaco's in 2020 presenting with parastomal abdominal pain, no findings of obstruction, or strangulated/incarcerated hernia. Lactate 1.4    Plan:  Pending attending discussion with Dr. Aguila        Consult received 12:06pm  Patient evaluated 1:00pm     55 yo M with colostomy s/p Guanaco's in 2020 presenting with parastomal abdominal pain, no findings of obstruction, or strangulated/incarcerated hernia. elevated lipase >900, persistent abdominal pain around colostomy site    Plan:  Please admit patient to Colorectal Surgery service under Dr. Aguila  Pain control as needed  AM labs, recheck lipase  Consistent carb diet/LR@42  Add on Monday 6/28 for open colostomy reversal with Dr. Aguila  Obtain medical clearance    Patient discussed with Dr. Aguila    Consult received 12:06pm  Patient evaluated 1:00pm

## 2021-06-26 NOTE — H&P ADULT - HISTORY OF PRESENT ILLNESS
57 yo M with DM, COPD, HTN, CAD presenting with one day of abdominal pain. Patient states it started this morning, described as a sharp, stabbing pain around his colostomy site. He had a similar episode in April in which nothing was found on imaging and he was discharged home. The pain resolved soon after. He has not had changes in colostomy output, empties his bag 3-4 times a day. No changes in consistency of output. Denies fevers, chills, nausea, vomiting.    Of note, he has a history of Guanaco's procedure done 3/2020 for sigmoid diverticulitis at [facility]. Patient met with Dr. Aguila and was originally planned 2/2021 for a colostomy reversal/possible ileostomy, case delayed because of elevated HgbA1c of 11.8. Patient has since changed his eating habits, lost weight, and last HgbA1c was 7.5 (May 4th).

## 2021-06-26 NOTE — ED STATDOCS - PROGRESS NOTE DETAILS
admit to [facility] evaluated by intake physician. HPI/PE/ROS as noted above. Will follow up plan per intake physician.

## 2021-06-27 ENCOUNTER — TRANSCRIPTION ENCOUNTER (OUTPATIENT)
Age: 56
End: 2021-06-27

## 2021-06-27 DIAGNOSIS — I10 ESSENTIAL (PRIMARY) HYPERTENSION: ICD-10-CM

## 2021-06-27 DIAGNOSIS — R91.1 SOLITARY PULMONARY NODULE: ICD-10-CM

## 2021-06-27 DIAGNOSIS — R74.8 ABNORMAL LEVELS OF OTHER SERUM ENZYMES: ICD-10-CM

## 2021-06-27 DIAGNOSIS — E78.5 HYPERLIPIDEMIA, UNSPECIFIED: ICD-10-CM

## 2021-06-27 DIAGNOSIS — Z87.19 PERSONAL HISTORY OF OTHER DISEASES OF THE DIGESTIVE SYSTEM: ICD-10-CM

## 2021-06-27 DIAGNOSIS — K46.9 UNSPECIFIED ABDOMINAL HERNIA WITHOUT OBSTRUCTION OR GANGRENE: ICD-10-CM

## 2021-06-27 DIAGNOSIS — E11.9 TYPE 2 DIABETES MELLITUS WITHOUT COMPLICATIONS: ICD-10-CM

## 2021-06-27 LAB
ANION GAP SERPL CALC-SCNC: 9 MMOL/L — SIGNIFICANT CHANGE UP (ref 5–17)
APTT BLD: 34.9 SEC — SIGNIFICANT CHANGE UP (ref 27.5–35.5)
BASOPHILS # BLD AUTO: 0.07 K/UL — SIGNIFICANT CHANGE UP (ref 0–0.2)
BASOPHILS NFR BLD AUTO: 1 % — SIGNIFICANT CHANGE UP (ref 0–2)
BLD GP AB SCN SERPL QL: SIGNIFICANT CHANGE UP
BUN SERPL-MCNC: 16.7 MG/DL — SIGNIFICANT CHANGE UP (ref 8–20)
CALCIUM SERPL-MCNC: 9 MG/DL — SIGNIFICANT CHANGE UP (ref 8.6–10.2)
CHLORIDE SERPL-SCNC: 101 MMOL/L — SIGNIFICANT CHANGE UP (ref 98–107)
CO2 SERPL-SCNC: 29 MMOL/L — SIGNIFICANT CHANGE UP (ref 22–29)
COVID-19 SPIKE DOMAIN AB INTERP: POSITIVE
COVID-19 SPIKE DOMAIN ANTIBODY RESULT: >250 U/ML — HIGH
CREAT SERPL-MCNC: 0.77 MG/DL — SIGNIFICANT CHANGE UP (ref 0.5–1.3)
EOSINOPHIL # BLD AUTO: 0.2 K/UL — SIGNIFICANT CHANGE UP (ref 0–0.5)
EOSINOPHIL NFR BLD AUTO: 2.9 % — SIGNIFICANT CHANGE UP (ref 0–6)
GLUCOSE BLDC GLUCOMTR-MCNC: 101 MG/DL — HIGH (ref 70–99)
GLUCOSE BLDC GLUCOMTR-MCNC: 111 MG/DL — HIGH (ref 70–99)
GLUCOSE BLDC GLUCOMTR-MCNC: 76 MG/DL — SIGNIFICANT CHANGE UP (ref 70–99)
GLUCOSE BLDC GLUCOMTR-MCNC: 99 MG/DL — SIGNIFICANT CHANGE UP (ref 70–99)
GLUCOSE SERPL-MCNC: 150 MG/DL — HIGH (ref 70–99)
HCT VFR BLD CALC: 47.8 % — SIGNIFICANT CHANGE UP (ref 39–50)
HGB BLD-MCNC: 15.8 G/DL — SIGNIFICANT CHANGE UP (ref 13–17)
IMM GRANULOCYTES NFR BLD AUTO: 0.3 % — SIGNIFICANT CHANGE UP (ref 0–1.5)
INR BLD: 1.06 RATIO — SIGNIFICANT CHANGE UP (ref 0.88–1.16)
LIDOCAIN IGE QN: 74 U/L — HIGH (ref 22–51)
LYMPHOCYTES # BLD AUTO: 2.27 K/UL — SIGNIFICANT CHANGE UP (ref 1–3.3)
LYMPHOCYTES # BLD AUTO: 32.8 % — SIGNIFICANT CHANGE UP (ref 13–44)
MAGNESIUM SERPL-MCNC: 1.8 MG/DL — SIGNIFICANT CHANGE UP (ref 1.6–2.6)
MCHC RBC-ENTMCNC: 28.6 PG — SIGNIFICANT CHANGE UP (ref 27–34)
MCHC RBC-ENTMCNC: 33.1 GM/DL — SIGNIFICANT CHANGE UP (ref 32–36)
MCV RBC AUTO: 86.6 FL — SIGNIFICANT CHANGE UP (ref 80–100)
MONOCYTES # BLD AUTO: 0.48 K/UL — SIGNIFICANT CHANGE UP (ref 0–0.9)
MONOCYTES NFR BLD AUTO: 6.9 % — SIGNIFICANT CHANGE UP (ref 2–14)
NEUTROPHILS # BLD AUTO: 3.88 K/UL — SIGNIFICANT CHANGE UP (ref 1.8–7.4)
NEUTROPHILS NFR BLD AUTO: 56.1 % — SIGNIFICANT CHANGE UP (ref 43–77)
PHOSPHATE SERPL-MCNC: 3.1 MG/DL — SIGNIFICANT CHANGE UP (ref 2.4–4.7)
PLATELET # BLD AUTO: 193 K/UL — SIGNIFICANT CHANGE UP (ref 150–400)
POTASSIUM SERPL-MCNC: 5.2 MMOL/L — SIGNIFICANT CHANGE UP (ref 3.5–5.3)
POTASSIUM SERPL-SCNC: 5.2 MMOL/L — SIGNIFICANT CHANGE UP (ref 3.5–5.3)
PROTHROM AB SERPL-ACNC: 12.3 SEC — SIGNIFICANT CHANGE UP (ref 10.6–13.6)
RBC # BLD: 5.52 M/UL — SIGNIFICANT CHANGE UP (ref 4.2–5.8)
RBC # FLD: 13.7 % — SIGNIFICANT CHANGE UP (ref 10.3–14.5)
SARS-COV-2 IGG+IGM SERPL QL IA: >250 U/ML — HIGH
SARS-COV-2 IGG+IGM SERPL QL IA: POSITIVE
SODIUM SERPL-SCNC: 139 MMOL/L — SIGNIFICANT CHANGE UP (ref 135–145)
WBC # BLD: 6.92 K/UL — SIGNIFICANT CHANGE UP (ref 3.8–10.5)
WBC # FLD AUTO: 6.92 K/UL — SIGNIFICANT CHANGE UP (ref 3.8–10.5)

## 2021-06-27 PROCEDURE — 99232 SBSQ HOSP IP/OBS MODERATE 35: CPT | Mod: 57

## 2021-06-27 PROCEDURE — 99222 1ST HOSP IP/OBS MODERATE 55: CPT

## 2021-06-27 RX ORDER — SOD SULF/SODIUM/NAHCO3/KCL/PEG
1000 SOLUTION, RECONSTITUTED, ORAL ORAL
Refills: 0 | Status: COMPLETED | OUTPATIENT
Start: 2021-06-27 | End: 2021-06-27

## 2021-06-27 RX ORDER — NEOMYCIN SULFATE 500 MG/1
1000 TABLET ORAL THREE TIMES A DAY
Refills: 0 | Status: COMPLETED | OUTPATIENT
Start: 2021-06-27 | End: 2021-06-28

## 2021-06-27 RX ORDER — METRONIDAZOLE 500 MG
500 TABLET ORAL THREE TIMES A DAY
Refills: 0 | Status: COMPLETED | OUTPATIENT
Start: 2021-06-27 | End: 2021-06-28

## 2021-06-27 RX ADMIN — Medication 500 MILLIGRAM(S): at 21:39

## 2021-06-27 RX ADMIN — ENOXAPARIN SODIUM 40 MILLIGRAM(S): 100 INJECTION SUBCUTANEOUS at 21:39

## 2021-06-27 RX ADMIN — NEOMYCIN SULFATE 1000 MILLIGRAM(S): 500 TABLET ORAL at 18:15

## 2021-06-27 RX ADMIN — MONTELUKAST 10 MILLIGRAM(S): 4 TABLET, CHEWABLE ORAL at 11:38

## 2021-06-27 RX ADMIN — NEOMYCIN SULFATE 1000 MILLIGRAM(S): 500 TABLET ORAL at 21:39

## 2021-06-27 RX ADMIN — Medication 500 MILLIGRAM(S): at 18:15

## 2021-06-27 RX ADMIN — Medication 1000 MILLILITER(S): at 21:39

## 2021-06-27 RX ADMIN — ATORVASTATIN CALCIUM 10 MILLIGRAM(S): 80 TABLET, FILM COATED ORAL at 21:39

## 2021-06-27 RX ADMIN — Medication 1000 MILLILITER(S): at 17:38

## 2021-06-27 NOTE — PROGRESS NOTE ADULT - TIME BILLING
Seen and examined.  Feels improved.  No complaints.  AFVSS  NAD  soft, NTND  Labs reviewed  Lipase elevated yest but now normal  A/P  - for colostomy reversal.  Clear liquid diet today.  Willard and moviprep.   NPO after MN.  T&S.  Medical clearance.  On schedule as add on for tomorrow.

## 2021-06-27 NOTE — CONSULT NOTE ADULT - SUBJECTIVE AND OBJECTIVE BOX
PMD : Soha   Cardio : None     Patient is a 56y old  Male who presents with a chief complaint of Abdominal pain  , admitted by colorectal surgery ,   planned for reversal of ileostomy. Medicine consulted for preop clearance .     CC: Abdominal pain started yesterday at about 5 am , sharp , non radiating ,   present only around ileostomy , no nausea , no vomiting , gas/ stool + in ileostomy .   Pain resolved since last night .       HPI:  57 yo M with DM- 2 , COPD, HTN, HLA,  Hx of JESUS - resolved with weight  lost , not on CPAP for last 2 yrs , hx of diverticulitis ,   s/p Guanaco"s procedure ( 3/20) ,  was originally planned 2/2021 for a colostomy reversal/possible ileostomy, case delayed because of elevated HgbA1c of 11.8. Patient has since changed his eating habits, lost weight, and last HgbA1c was 7.5  presented with one day of abdominal pain ( 6/26)  described as a sharp, stabbing pain around his colostomy site. He had a similar episode in April in which nothing was found on imaging and he was discharged home. The pain resolved soon after. He has not had changes in colostomy output, empties his bag 3-4 times a day. No changes in consistency of output. Denies fevers, chills, nausea, vomiting.        PAST MEDICAL & SURGICAL HISTORY:  Hyperlipidemia    Diabetes mellitus  type 2    COPD     GERD (gastroesophageal reflux disease)    Sleep apnea  resloved after wieght loss    History of diverticulitis    History of colon resection    Lung nodule  Two small solid nodules (0.5cm) are noted in the right upper and left lower lobes.    S/p nephrectomy  left, congenital defect  9y/o    S/P arthroscopy  left, torn meniscus 25yrs ago    Inguinal hernia  left    History of endoscopy  6/2019    H/O colonoscopy  6/2019    Status post colostomy        Social History:  Tabacco -   ETOH -   Illicit drug abuse - denies    FAMILY HISTORY:  No pertinent family history in first degree relatives        Allergies    Goo Jakub Spray- asthma (Other)  No Known Drug Allergies    Intolerances        HOME MEDICATIONS :     · 	metFORMIN 1000 mg oral tablet: 1 tab(s) orally 2 times a day  · 	atorvastatin 10 mg oral tablet: 1 tab(s) orally once a day (at bedtime)  · 	montelukast 10 mg oral tablet: 1 tab(s) orally once a day  · 	LOSARTAN 50MG TABLETS:   · 	Jardiance 25 mg oral tablet: 1 tab(s) orally once a day (in the morning)    REVIEW OF SYSTEMS:    Abdominal pain around ileostomy present yesterday - pain resolved since yesterday ,   all other systems are reviewed and are negative .     MEDICATIONS  (STANDING):  atorvastatin 10 milliGRAM(s) Oral at bedtime  dextrose 40% Gel 15 Gram(s) Oral once  dextrose 5%. 1000 milliLiter(s) (100 mL/Hr) IV Continuous <Continuous>  dextrose 5%. 1000 milliLiter(s) (50 mL/Hr) IV Continuous <Continuous>  dextrose 50% Injectable 25 Gram(s) IV Push once  dextrose 50% Injectable 12.5 Gram(s) IV Push once  dextrose 50% Injectable 25 Gram(s) IV Push once  enoxaparin Injectable 40 milliGRAM(s) SubCutaneous every 24 hours  glucagon  Injectable 1 milliGRAM(s) IntraMuscular once  insulin lispro (ADMELOG) corrective regimen sliding scale   SubCutaneous three times a day before meals  lactated ringers. 1000 milliLiter(s) (75 mL/Hr) IV Continuous <Continuous>  losartan 50 milliGRAM(s) Oral daily  montelukast 10 milliGRAM(s) Oral daily    MEDICATIONS  (PRN):  acetaminophen   Tablet .. 650 milliGRAM(s) Oral every 6 hours PRN Mild Pain (1 - 3), Moderate Pain (4 - 6)  ALBUTerol   0.042% 1.25 milliGRAM(s) Nebulizer three times a day PRN Shortness of Breath and/or Wheezing      Vital Signs Last 24 Hrs  T(C): 36.7 (27 Jun 2021 11:11), Max: 36.7 (27 Jun 2021 07:23)  T(F): 98.1 (27 Jun 2021 11:11), Max: 98.1 (27 Jun 2021 07:23)  HR: 58 (27 Jun 2021 11:11) (52 - 63)  BP: 128/78 (27 Jun 2021 11:11) (107/66 - 128/78)  BP(mean): --  RR: 18 (27 Jun 2021 11:11) (16 - 20)  SpO2: 94% (27 Jun 2021 11:11) (94% - 99%)    PHYSICAL EXAM:    GENERAL: NAD, well-groomed, well-developed  HEAD:  Atraumatic, Normocephalic  EYES: EOMI, PERRLA, conjunctiva and sclera clear  NECK: Supple, No JVD, Normal thyroid  NERVOUS SYSTEM:  Alert & Oriented X 4 , no focal deficit   CHEST/LUNG: CTA  b/l,  no rales, rhonchi, wheezing, or rubs  HEART: Regular rate and rhythm; No murmurs, rubs, or gallops  ABDOMEN: Soft, Nontender, Nondistended; Bowel sounds present  ileostomy + , air / stool +   EXTREMITIES:  2+ Peripheral Pulses, No clubbing, cyanosis, or edema ,   LYMPH: No lymphadenopathy noted  SKIN: No rashes or lesions    LABS:                        15.8   6.92  )-----------( 193      ( 27 Jun 2021 10:36 )             47.8     06-27    139  |  101  |  16.7  ----------------------------<  150<H>  5.2   |  29.0  |  0.77  COVID-19 PCR . (06.26.21 @ 15:32)    COVID-19 PCR: NotDetec: You can help in the fight against COVID-19. [facility] may contact  you to see if you are interested in voluntarily participating in one of  our clinical trials.  Testing is performed using polymerase chain reaction (PCR) or  transcription mediated amplification (TMA). This COVID-19 (SARS-CoV-2)  nucleic acid amplification test was validated by [facility] and is  in use under the FDA Emergency Use Authorization (EUA) for clinical labs  CLIA-certified to perform high complexity testing. Test results should be  correlated with clinical presentation, patient history, and epidemiology.      Ca    9.0      27 Jun 2021 10:36  Phos  3.1     06-27  Mg     1.8     06-27    TPro  6.9  /  Alb  4.2  /  TBili  0.4  /  DBili  x   /  AST  20  /  ALT  22  /  AlkPhos  89  06-26    PT/INR - ( 27 Jun 2021 10:36 )   PT: 12.3 sec;   INR: 1.06 ratio         PTT - ( 27 Jun 2021 10:36 )  PTT:34.9 sec    Lipase, Serum: 984 U/L (06.26.21 @ 11:13)    Lipase, Serum: 74 U/L (06.27.21 @ 10:36)    A1C with Estimated Average Glucose Result: 11.8 % (02.11.21 @ 14:55)    A1C with Estimated Average Glucose Result: 11.8 % (02.11.21 @ 14:55)    COVID-19 PCR: NotDetec: You can help in the fight against COVID-19. [facility] may contact  you to see if you are interested in voluntarily participating in one of  our clinical trials.  Testing is performed using polymerase chain reaction (PCR) or  transcription mediated amplification (TMA). This COVID-19 (SARS-CoV-2)  nucleic acid amplification test was validated by [facility] and is  in use under the FDA Emergency Use Authorization (EUA) for clinical labs  CLIA-certified to perform high complexity testing. Test results should be  correlated with clinical presentation, patient history, and epidemiology. (06.26.21 @ 15:32)    < from: 12 Lead ECG (06.26.21 @ 20:28) >    Ventricular Rate 50 BPM    Atrial Rate 50 BPM    P-R Interval 198 ms    QRS Duration 108 ms    Q-T Interval 422 ms    QTC Calculation(Bazett) 384 ms    P Axis 75 degrees    R Axis 84 degrees    T Axis 68 degrees    Diagnosis Line Sinus bradycardia    Confirmed by TERESO NUNEZ ([id number]) on 6/27/2021 8:55:13 AM    < end of copied text >          RADIOLOGY & ADDITIONAL STUDIES:    < from: Xray Chest 1 View AP/PA. (04.07.21 @ 23:06) >   EXAM:  XR CHEST AP OR PA 1V                          PROCEDURE DATE:  04/07/2021          INTERPRETATION:  Portable chest radiograph    CLINICAL INFORMATION: Dyspnea, shortness of breath.    TECHNIQUE:  Portable  AP view of the chest was obtained.    COMPARISON: 2/9/2016 chest radiograph examinations  available for review.    FINDINGS:    The lungs are clear of airspace consolidations or effusions. No pneumothorax.    The heart and mediastinum are within normal limits.    Visualized osseous structures are intact.      IMPRESSION:   No evidence of active chest disease.    DANIA SORENSEN MD; Attending Radiologist  This document has been electronically signed. Apr 8 2021 12:43PM    < end of copied text >    < from: CT Abdomen and Pelvis w/ Oral Cont and w/ IV Cont (06.26.21 @ 12:48) >     EXAM:  CT ABDOMEN AND PELVIS OC IC                          PROCEDURE DATE:  06/26/2021          INTERPRETATION:  CLINICAL INFORMATION: Abdominal pain, acute, nonlocalized LLQ pain, parastomal pain    COMPARISON: 4.8.21.    CONTRAST/COMPLICATIONS:  IV Contrast: Visipaque 320  96 cc administered   4 cc discarded  Oral Contrast: Omnipaque 300 + Fruit 2o  Complications: None reported at time of study completion    PROCEDURE:  CT of the Abdomen and Pelvis was performed.  Sagittal and coronal reformats were performed.    FINDINGS:    LOWER CHEST: Within normal limits.    LIVER: Within normal limits.  BILE DUCTS: Normal caliber.  GALLBLADDER: Within normal limits.  SPLEEN: Within normal limits.  PANCREAS: Unchanged main duct dilatation.  ADRENALS: Within normal limits.  KIDNEYS/URETERS: Absent left kidney.    BLADDER: Within normal limits.  REPRODUCTIVE ORGANS: Within normal limits.    BOWEL: No bowel obstruction. The appendix is normal.  Status post Watson's pouch with descending colostomy.  PERITONEUM: No ascites.  VESSELS:  Within normal limits.  RETROPERITONEUM/LYMPH NODES: No lymphadenopathy.  ABDOMINAL WALL: Small right inguinal hernia. There is unchanged parastomal hernia containing small bowel. Small fat-containing ventral hernias are noted.  BONES: Within normal limits.    IMPRESSION: Unchanged large parastomal hernia containing small bowel. Small fat-containing ventral hernias are noted.    SID REYES MD; Attending Radiologist  This document has been electronically signed. Jun 26 2021  1:14PM    < end of copied text >

## 2021-06-27 NOTE — CONSULT NOTE ADULT - PROBLEM SELECTOR RECOMMENDATION 4
- patient with A1C > 11 in February- now well controlled   A1C - 7.5 with life style  changes  , ISSC , accu check ,   hold on home PO hypoglycemics

## 2021-06-27 NOTE — PROGRESS NOTE ADULT - SUBJECTIVE AND OBJECTIVE BOX
HPI/OVERNIGHT EVENTS: No acute events overnight. Pain is well controlled. Tolerating diet without nausea or vomiting. Denies fever, chills, nausea, vomiting, chest pain, SOB, dizziness, abd pain or any other concerning symptoms    Vital Signs Last 24 Hrs  T(C): 36.6 (27 Jun 2021 04:52), Max: 37.1 (26 Jun 2021 09:58)  T(F): 97.8 (27 Jun 2021 04:52), Max: 98.8 (26 Jun 2021 09:58)  HR: 52 (27 Jun 2021 04:52) (52 - 63)  BP: 116/67 (27 Jun 2021 04:52) (107/66 - 159/88)  BP(mean): --  RR: 18 (27 Jun 2021 04:52) (16 - 20)  SpO2: 97% (27 Jun 2021 04:52) (96% - 99%)      PE:  Constitutional: patient resting comfortably in bed, in no acute distress  HEENT: EOMI / PERRL b/l   Neck: No JVD, full ROM without pain  Respiratory: CTAB respirations are unlabored, no accessory muscle use, no conversational dyspnea  Cardiovascular: regular rate & rhythm   Gastrointestinal: Abdomen soft, parastomal tenderness, minimally distended, no rebound tenderness / guarding, colostomy with appropriate output  Musculoskeletal: No joint pain, swelling or deformity; no limitation of movement    LABS:                        15.9   7.96  )-----------( 199      ( 26 Jun 2021 11:13 )             47.4     06-26    137  |  102  |  25.5<H>  ----------------------------<  134<H>  4.9   |  26.0  |  0.81    Ca    9.4      26 Jun 2021 11:13    TPro  6.9  /  Alb  4.2  /  TBili  0.4  /  DBili  x   /  AST  20  /  ALT  22  /  AlkPhos  89  06-26          MEDICATIONS  (STANDING):  atorvastatin 10 milliGRAM(s) Oral at bedtime  dextrose 40% Gel 15 Gram(s) Oral once  dextrose 5%. 1000 milliLiter(s) (50 mL/Hr) IV Continuous <Continuous>  dextrose 5%. 1000 milliLiter(s) (100 mL/Hr) IV Continuous <Continuous>  dextrose 50% Injectable 25 Gram(s) IV Push once  dextrose 50% Injectable 12.5 Gram(s) IV Push once  dextrose 50% Injectable 25 Gram(s) IV Push once  enoxaparin Injectable 40 milliGRAM(s) SubCutaneous every 24 hours  glucagon  Injectable 1 milliGRAM(s) IntraMuscular once  insulin lispro (ADMELOG) corrective regimen sliding scale   SubCutaneous three times a day before meals  lactated ringers. 1000 milliLiter(s) (75 mL/Hr) IV Continuous <Continuous>  losartan 50 milliGRAM(s) Oral daily  montelukast 10 milliGRAM(s) Oral daily    MEDICATIONS  (PRN):  acetaminophen   Tablet .. 650 milliGRAM(s) Oral every 6 hours PRN Mild Pain (1 - 3), Moderate Pain (4 - 6)  ALBUTerol   0.042% 1.25 milliGRAM(s) Nebulizer three times a day PRN Shortness of Breath and/or Wheezing

## 2021-06-27 NOTE — CONSULT NOTE ADULT - ASSESSMENT
55 yo M with DM- 2 , COPD, HTN, Hx of JESUS - resolved with weight  lost , not on CPAP for last 2 yrs , hx of diverticulitis ,   s/p Guanaco"s procedure ( 3/20) ,  was originally planned 2/2021 for a colostomy reversal/possible ileostomy, case delayed because of elevated HgbA1c of 11.8. Patient has since changed his eating habits, lost weight, and last HgbA1c was 7.5  presented with one day of abdominal pain ( 6/26)  described as a sharp, stabbing pain around his colostomy site. He had a similar episode in April in which nothing was found on imaging and he was discharged home. The pain resolved soon after. He has not had changes in colostomy output, empties his bag 3-4 times a day. No changes in consistency of output. Denies fevers, chills, nausea, vomiting.

## 2021-06-27 NOTE — CONSULT NOTE ADULT - PROBLEM SELECTOR RECOMMENDATION 5
, s/p colostomy , now planned for open colostomy reversal .     Hx of JESUS - of CPAP for last 2 yrs - after wt loss     Patient denies hx of CAD , no hx of sob , chest pain , METS > 4 ,   labs / imaging / EKG  noted -   patient is medically optimized for planned OR .   Will no longer follow up , plese recall if needed post op .

## 2021-06-28 ENCOUNTER — RESULT REVIEW (OUTPATIENT)
Age: 56
End: 2021-06-28

## 2021-06-28 LAB
ANION GAP SERPL CALC-SCNC: 11 MMOL/L — SIGNIFICANT CHANGE UP (ref 5–17)
BUN SERPL-MCNC: 19.4 MG/DL — SIGNIFICANT CHANGE UP (ref 8–20)
CALCIUM SERPL-MCNC: 9 MG/DL — SIGNIFICANT CHANGE UP (ref 8.6–10.2)
CHLORIDE SERPL-SCNC: 107 MMOL/L — SIGNIFICANT CHANGE UP (ref 98–107)
CO2 SERPL-SCNC: 25 MMOL/L — SIGNIFICANT CHANGE UP (ref 22–29)
CREAT SERPL-MCNC: 0.87 MG/DL — SIGNIFICANT CHANGE UP (ref 0.5–1.3)
GLUCOSE BLDC GLUCOMTR-MCNC: 101 MG/DL — HIGH (ref 70–99)
GLUCOSE BLDC GLUCOMTR-MCNC: 104 MG/DL — HIGH (ref 70–99)
GLUCOSE BLDC GLUCOMTR-MCNC: 107 MG/DL — HIGH (ref 70–99)
GLUCOSE BLDC GLUCOMTR-MCNC: 126 MG/DL — HIGH (ref 70–99)
GLUCOSE BLDC GLUCOMTR-MCNC: 133 MG/DL — HIGH (ref 70–99)
GLUCOSE SERPL-MCNC: 121 MG/DL — HIGH (ref 70–99)
HCT VFR BLD CALC: 48 % — SIGNIFICANT CHANGE UP (ref 39–50)
HGB BLD-MCNC: 15.5 G/DL — SIGNIFICANT CHANGE UP (ref 13–17)
INR BLD: 1.12 RATIO — SIGNIFICANT CHANGE UP (ref 0.88–1.16)
MAGNESIUM SERPL-MCNC: 1.8 MG/DL — SIGNIFICANT CHANGE UP (ref 1.6–2.6)
MCHC RBC-ENTMCNC: 28.4 PG — SIGNIFICANT CHANGE UP (ref 27–34)
MCHC RBC-ENTMCNC: 32.3 GM/DL — SIGNIFICANT CHANGE UP (ref 32–36)
MCV RBC AUTO: 87.9 FL — SIGNIFICANT CHANGE UP (ref 80–100)
PHOSPHATE SERPL-MCNC: 3.1 MG/DL — SIGNIFICANT CHANGE UP (ref 2.4–4.7)
PLATELET # BLD AUTO: 182 K/UL — SIGNIFICANT CHANGE UP (ref 150–400)
POTASSIUM SERPL-MCNC: 4.6 MMOL/L — SIGNIFICANT CHANGE UP (ref 3.5–5.3)
POTASSIUM SERPL-SCNC: 4.6 MMOL/L — SIGNIFICANT CHANGE UP (ref 3.5–5.3)
PROTHROM AB SERPL-ACNC: 12.9 SEC — SIGNIFICANT CHANGE UP (ref 10.6–13.6)
RBC # BLD: 5.46 M/UL — SIGNIFICANT CHANGE UP (ref 4.2–5.8)
RBC # FLD: 13.6 % — SIGNIFICANT CHANGE UP (ref 10.3–14.5)
SODIUM SERPL-SCNC: 142 MMOL/L — SIGNIFICANT CHANGE UP (ref 135–145)
WBC # BLD: 6.71 K/UL — SIGNIFICANT CHANGE UP (ref 3.8–10.5)
WBC # FLD AUTO: 6.71 K/UL — SIGNIFICANT CHANGE UP (ref 3.8–10.5)

## 2021-06-28 PROCEDURE — 88307 TISSUE EXAM BY PATHOLOGIST: CPT | Mod: 26

## 2021-06-28 PROCEDURE — 44620 REPAIR BOWEL OPENING: CPT

## 2021-06-28 PROCEDURE — 88304 TISSUE EXAM BY PATHOLOGIST: CPT | Mod: 26

## 2021-06-28 PROCEDURE — 45300 PROCTOSIGMOIDOSCOPY DX: CPT

## 2021-06-28 PROCEDURE — 88305 TISSUE EXAM BY PATHOLOGIST: CPT | Mod: 26

## 2021-06-28 RX ORDER — MAGNESIUM SULFATE 500 MG/ML
2 VIAL (ML) INJECTION ONCE
Refills: 0 | Status: COMPLETED | OUTPATIENT
Start: 2021-06-28 | End: 2021-06-28

## 2021-06-28 RX ORDER — MONTELUKAST 4 MG/1
10 TABLET, CHEWABLE ORAL DAILY
Refills: 0 | Status: DISCONTINUED | OUTPATIENT
Start: 2021-06-28 | End: 2021-07-01

## 2021-06-28 RX ORDER — SODIUM CHLORIDE 9 MG/ML
1000 INJECTION, SOLUTION INTRAVENOUS
Refills: 0 | Status: DISCONTINUED | OUTPATIENT
Start: 2021-06-28 | End: 2021-06-28

## 2021-06-28 RX ORDER — CEFOTETAN DISODIUM 1 G
2 VIAL (EA) INJECTION EVERY 12 HOURS
Refills: 0 | Status: COMPLETED | OUTPATIENT
Start: 2021-06-28 | End: 2021-06-29

## 2021-06-28 RX ORDER — INSULIN LISPRO 100/ML
VIAL (ML) SUBCUTANEOUS AT BEDTIME
Refills: 0 | Status: DISCONTINUED | OUTPATIENT
Start: 2021-06-28 | End: 2021-07-01

## 2021-06-28 RX ORDER — TRAMADOL HYDROCHLORIDE 50 MG/1
25 TABLET ORAL EVERY 4 HOURS
Refills: 0 | Status: DISCONTINUED | OUTPATIENT
Start: 2021-06-28 | End: 2021-06-29

## 2021-06-28 RX ORDER — ONDANSETRON 8 MG/1
4 TABLET, FILM COATED ORAL EVERY 6 HOURS
Refills: 0 | Status: DISCONTINUED | OUTPATIENT
Start: 2021-06-28 | End: 2021-07-01

## 2021-06-28 RX ORDER — SODIUM CHLORIDE 9 MG/ML
1000 INJECTION, SOLUTION INTRAVENOUS
Refills: 0 | Status: DISCONTINUED | OUTPATIENT
Start: 2021-06-28 | End: 2021-07-01

## 2021-06-28 RX ORDER — HYDROMORPHONE HYDROCHLORIDE 2 MG/ML
0.5 INJECTION INTRAMUSCULAR; INTRAVENOUS; SUBCUTANEOUS
Refills: 0 | Status: DISCONTINUED | OUTPATIENT
Start: 2021-06-28 | End: 2021-06-28

## 2021-06-28 RX ORDER — DEXTROSE 50 % IN WATER 50 %
25 SYRINGE (ML) INTRAVENOUS ONCE
Refills: 0 | Status: DISCONTINUED | OUTPATIENT
Start: 2021-06-28 | End: 2021-07-01

## 2021-06-28 RX ORDER — INSULIN LISPRO 100/ML
VIAL (ML) SUBCUTANEOUS
Refills: 0 | Status: DISCONTINUED | OUTPATIENT
Start: 2021-06-28 | End: 2021-07-01

## 2021-06-28 RX ORDER — KETOROLAC TROMETHAMINE 30 MG/ML
15 SYRINGE (ML) INJECTION EVERY 8 HOURS
Refills: 0 | Status: DISCONTINUED | OUTPATIENT
Start: 2021-06-28 | End: 2021-06-30

## 2021-06-28 RX ORDER — BUPIVACAINE 13.3 MG/ML
20 INJECTION, SUSPENSION, LIPOSOMAL INFILTRATION ONCE
Refills: 0 | Status: DISCONTINUED | OUTPATIENT
Start: 2021-06-28 | End: 2021-06-28

## 2021-06-28 RX ORDER — LOSARTAN POTASSIUM 100 MG/1
50 TABLET, FILM COATED ORAL DAILY
Refills: 0 | Status: DISCONTINUED | OUTPATIENT
Start: 2021-06-28 | End: 2021-07-01

## 2021-06-28 RX ORDER — ENOXAPARIN SODIUM 100 MG/ML
40 INJECTION SUBCUTANEOUS DAILY
Refills: 0 | Status: DISCONTINUED | OUTPATIENT
Start: 2021-06-28 | End: 2021-07-01

## 2021-06-28 RX ORDER — DEXTROSE 50 % IN WATER 50 %
15 SYRINGE (ML) INTRAVENOUS ONCE
Refills: 0 | Status: DISCONTINUED | OUTPATIENT
Start: 2021-06-28 | End: 2021-07-01

## 2021-06-28 RX ORDER — TRAMADOL HYDROCHLORIDE 50 MG/1
50 TABLET ORAL EVERY 4 HOURS
Refills: 0 | Status: DISCONTINUED | OUTPATIENT
Start: 2021-06-28 | End: 2021-06-29

## 2021-06-28 RX ORDER — DEXTROSE 50 % IN WATER 50 %
12.5 SYRINGE (ML) INTRAVENOUS ONCE
Refills: 0 | Status: DISCONTINUED | OUTPATIENT
Start: 2021-06-28 | End: 2021-07-01

## 2021-06-28 RX ORDER — ATORVASTATIN CALCIUM 80 MG/1
10 TABLET, FILM COATED ORAL AT BEDTIME
Refills: 0 | Status: DISCONTINUED | OUTPATIENT
Start: 2021-06-28 | End: 2021-07-01

## 2021-06-28 RX ORDER — ALBUTEROL 90 UG/1
2.5 AEROSOL, METERED ORAL EVERY 6 HOURS
Refills: 0 | Status: DISCONTINUED | OUTPATIENT
Start: 2021-06-28 | End: 2021-07-01

## 2021-06-28 RX ORDER — CEFOTETAN DISODIUM 1 G
2 VIAL (EA) INJECTION ONCE
Refills: 0 | Status: DISCONTINUED | OUTPATIENT
Start: 2021-06-28 | End: 2021-06-28

## 2021-06-28 RX ORDER — GLUCAGON INJECTION, SOLUTION 0.5 MG/.1ML
1 INJECTION, SOLUTION SUBCUTANEOUS ONCE
Refills: 0 | Status: DISCONTINUED | OUTPATIENT
Start: 2021-06-28 | End: 2021-07-01

## 2021-06-28 RX ORDER — ACETAMINOPHEN 500 MG
975 TABLET ORAL EVERY 6 HOURS
Refills: 0 | Status: DISCONTINUED | OUTPATIENT
Start: 2021-06-28 | End: 2021-07-01

## 2021-06-28 RX ORDER — SODIUM CHLORIDE 9 MG/ML
1000 INJECTION, SOLUTION INTRAVENOUS
Refills: 0 | Status: DISCONTINUED | OUTPATIENT
Start: 2021-06-28 | End: 2021-06-30

## 2021-06-28 RX ADMIN — ENOXAPARIN SODIUM 40 MILLIGRAM(S): 100 INJECTION SUBCUTANEOUS at 21:40

## 2021-06-28 RX ADMIN — Medication 500 MILLIGRAM(S): at 00:19

## 2021-06-28 RX ADMIN — HYDROMORPHONE HYDROCHLORIDE 0.5 MILLIGRAM(S): 2 INJECTION INTRAMUSCULAR; INTRAVENOUS; SUBCUTANEOUS at 19:15

## 2021-06-28 RX ADMIN — Medication 50 GRAM(S): at 07:56

## 2021-06-28 RX ADMIN — Medication 15 MILLIGRAM(S): at 21:27

## 2021-06-28 RX ADMIN — HYDROMORPHONE HYDROCHLORIDE 0.5 MILLIGRAM(S): 2 INJECTION INTRAMUSCULAR; INTRAVENOUS; SUBCUTANEOUS at 18:45

## 2021-06-28 RX ADMIN — ATORVASTATIN CALCIUM 10 MILLIGRAM(S): 80 TABLET, FILM COATED ORAL at 21:41

## 2021-06-28 RX ADMIN — Medication 975 MILLIGRAM(S): at 23:37

## 2021-06-28 RX ADMIN — TRAMADOL HYDROCHLORIDE 50 MILLIGRAM(S): 50 TABLET ORAL at 23:37

## 2021-06-28 RX ADMIN — HYDROMORPHONE HYDROCHLORIDE 0.5 MILLIGRAM(S): 2 INJECTION INTRAMUSCULAR; INTRAVENOUS; SUBCUTANEOUS at 18:35

## 2021-06-28 RX ADMIN — HYDROMORPHONE HYDROCHLORIDE 0.5 MILLIGRAM(S): 2 INJECTION INTRAMUSCULAR; INTRAVENOUS; SUBCUTANEOUS at 19:00

## 2021-06-28 RX ADMIN — NEOMYCIN SULFATE 1000 MILLIGRAM(S): 500 TABLET ORAL at 00:19

## 2021-06-28 RX ADMIN — SODIUM CHLORIDE 75 MILLILITER(S): 9 INJECTION, SOLUTION INTRAVENOUS at 05:21

## 2021-06-28 NOTE — BRIEF OPERATIVE NOTE - OPERATION/FINDINGS
Abdomen entered through prior midline incision, wound protector placed  Extensive lysis of adhesions  Colostomy taken down  Descending colon and splenic flexure mobilized  Rectal stump dissected freely  Colorectal anastomosis performed using 28mm EEA stapler  Rigid proctoscopy performed, anastomosis visualized, intact, no bleeding, at approx.. 12cm from anal verge, negative leak test  Abdomen closed using 0 looped PDS   Prior colostomy site approximated with 1 Vicryl purse-string suture and packed with 1'' plain packing

## 2021-06-28 NOTE — PROGRESS NOTE ADULT - SUBJECTIVE AND OBJECTIVE BOX
HPI/OVERNIGHT EVENTS: No acute events overnight. Pain is well controlled. Tolerating diet without nausea or vomiting. Denies fever, chills, nausea, vomiting, chest pain, SOB, dizziness, abd pain or any other concerning symptoms.  Patient added on for OR today for colostomy reversal.    Vital Signs Last 24 Hrs  T(C): 36.6 (27 Jun 2021 04:52), Max: 37.1 (26 Jun 2021 09:58)  T(F): 97.8 (27 Jun 2021 04:52), Max: 98.8 (26 Jun 2021 09:58)  HR: 52 (27 Jun 2021 04:52) (52 - 63)  BP: 116/67 (27 Jun 2021 04:52) (107/66 - 159/88)  BP(mean): --  RR: 18 (27 Jun 2021 04:52) (16 - 20)  SpO2: 97% (27 Jun 2021 04:52) (96% - 99%)      PE:  Constitutional: patient resting comfortably in bed, in no acute distress  HEENT: EOMI / PERRL b/l   Neck: No JVD, full ROM without pain  Respiratory: CTAB respirations are unlabored, no accessory muscle use, no conversational dyspnea  Cardiovascular: regular rate & rhythm   Gastrointestinal: Abdomen soft, parastomal tenderness, minimally distended, no rebound tenderness / guarding, colostomy with appropriate output  Musculoskeletal: No joint pain, swelling or deformity; no limitation of movement    LABS:                        15.9   7.96  )-----------( 199      ( 26 Jun 2021 11:13 )             47.4     06-26    137  |  102  |  25.5<H>  ----------------------------<  134<H>  4.9   |  26.0  |  0.81    Ca    9.4      26 Jun 2021 11:13    TPro  6.9  /  Alb  4.2  /  TBili  0.4  /  DBili  x   /  AST  20  /  ALT  22  /  AlkPhos  89  06-26          MEDICATIONS  (STANDING):  atorvastatin 10 milliGRAM(s) Oral at bedtime  dextrose 40% Gel 15 Gram(s) Oral once  dextrose 5%. 1000 milliLiter(s) (50 mL/Hr) IV Continuous <Continuous>  dextrose 5%. 1000 milliLiter(s) (100 mL/Hr) IV Continuous <Continuous>  dextrose 50% Injectable 25 Gram(s) IV Push once  dextrose 50% Injectable 12.5 Gram(s) IV Push once  dextrose 50% Injectable 25 Gram(s) IV Push once  enoxaparin Injectable 40 milliGRAM(s) SubCutaneous every 24 hours  glucagon  Injectable 1 milliGRAM(s) IntraMuscular once  insulin lispro (ADMELOG) corrective regimen sliding scale   SubCutaneous three times a day before meals  lactated ringers. 1000 milliLiter(s) (75 mL/Hr) IV Continuous <Continuous>  losartan 50 milliGRAM(s) Oral daily  montelukast 10 milliGRAM(s) Oral daily    MEDICATIONS  (PRN):  acetaminophen   Tablet .. 650 milliGRAM(s) Oral every 6 hours PRN Mild Pain (1 - 3), Moderate Pain (4 - 6)  ALBUTerol   0.042% 1.25 milliGRAM(s) Nebulizer three times a day PRN Shortness of Breath and/or Wheezing

## 2021-06-28 NOTE — CHART NOTE - NSCHARTNOTEFT_GEN_A_CORE
Subjective/Overnight event: Evaluated at bedside found laying down in no distress. Denies any fever/chills, nausea/vomiting, dizziness, SOB chest pain, constipation/diarrhea, weakness, numbness.       MEDICATIONS  (STANDING):  aspirin enteric coated 81 milliGRAM(s) Oral daily  atorvastatin 40 milliGRAM(s) Oral at bedtime  cadexomer iodine 0.9% Gel 1 Application(s) Topical daily  calcium acetate 667 milliGRAM(s) Oral four times a day with meals  carvedilol 25 milliGRAM(s) Oral every 12 hours  chlorhexidine 4% Liquid 1 Application(s) Topical Once  dextrose 40% Gel 15 Gram(s) Oral once  dextrose 5%. 1000 milliLiter(s) (50 mL/Hr) IV Continuous <Continuous>  dextrose 5%. 1000 milliLiter(s) (100 mL/Hr) IV Continuous <Continuous>  dextrose 50% Injectable 25 Gram(s) IV Push once  dextrose 50% Injectable 12.5 Gram(s) IV Push once  dextrose 50% Injectable 25 Gram(s) IV Push once  furosemide    Tablet 80 milliGRAM(s) Oral daily  glucagon  Injectable 1 milliGRAM(s) IntraMuscular once  hydrALAZINE 50 milliGRAM(s) Oral every 8 hours  insulin glargine Injectable (LANTUS) 30 Unit(s) SubCutaneous at bedtime  insulin lispro (ADMELOG) corrective regimen sliding scale   SubCutaneous Before meals and at bedtime  isosorbide   mononitrate ER Tablet (IMDUR) 30 milliGRAM(s) Oral daily  Nephro-noe 1 Tablet(s) Oral daily  piperacillin/tazobactam IVPB.. 3.375 Gram(s) IV Intermittent every 12 hours    MEDICATIONS  (PRN):      Vital Signs:  Vital Signs Last 24 Hrs  T(C): 36.6 (26 May 2021 16:00), Max: 36.6 (26 May 2021 10:37)  T(F): 97.9 (26 May 2021 16:00), Max: 97.9 (26 May 2021 16:00)  HR: 81 (26 May 2021 16:00) (71 - 81)  BP: 142/59 (26 May 2021 16:00) (142/59 - 174/81)  BP(mean): --  RR: 17 (26 May 2021 16:00) (16 - 17)  SpO2: 100% (26 May 2021 16:00) (100% - 100%)    CAPILLARY BLOOD GLUCOSE      POCT Blood Glucose.: 118 mg/dL (26 May 2021 17:53)  POCT Blood Glucose.: 113 mg/dL (26 May 2021 14:32)      I&O's Detail      Physical Examination:  General: alert, oriented x 3 in no acute distress   CV: normal S1/S2, RRR, no gallops, murmurs or rubs   Lungs: clear to auscultation b/l, symmetric chest movement, no rales, rhonchi or wheezing   Abdomen: soft, non-tender, non-distended, +BS  EXT: sensation intact, full ROM     Labs:    05-26    136  |  98  |  52.0<H>  ----------------------------<  145<H>  5.0   |  25.0  |  9.48<H>    Ca    10.0      26 May 2021 10:06    TPro  7.9  /  Alb  3.9  /  TBili  0.3<L>  /  DBili  x   /  AST  13  /  ALT  8   /  AlkPhos  87  05-26    LIVER FUNCTIONS - ( 26 May 2021 10:06 )  Alb: 3.9 g/dL / Pro: 7.9 g/dL / ALK PHOS: 87 U/L / ALT: 8 U/L / AST: 13 U/L / GGT: x                                 11.2   10.37 )-----------( 133      ( 26 May 2021 10:06 )             36.9

## 2021-06-29 LAB
ANION GAP SERPL CALC-SCNC: 9 MMOL/L — SIGNIFICANT CHANGE UP (ref 5–17)
ANISOCYTOSIS BLD QL: SLIGHT — SIGNIFICANT CHANGE UP
BASOPHILS # BLD AUTO: 0 K/UL — SIGNIFICANT CHANGE UP (ref 0–0.2)
BASOPHILS NFR BLD AUTO: 0 % — SIGNIFICANT CHANGE UP (ref 0–2)
BUN SERPL-MCNC: 23.6 MG/DL — HIGH (ref 8–20)
CALCIUM SERPL-MCNC: 8.2 MG/DL — LOW (ref 8.6–10.2)
CHLORIDE SERPL-SCNC: 102 MMOL/L — SIGNIFICANT CHANGE UP (ref 98–107)
CO2 SERPL-SCNC: 25 MMOL/L — SIGNIFICANT CHANGE UP (ref 22–29)
CREAT SERPL-MCNC: 1.2 MG/DL — SIGNIFICANT CHANGE UP (ref 0.5–1.3)
DACRYOCYTES BLD QL SMEAR: SLIGHT — SIGNIFICANT CHANGE UP
EOSINOPHIL # BLD AUTO: 0.09 K/UL — SIGNIFICANT CHANGE UP (ref 0–0.5)
EOSINOPHIL NFR BLD AUTO: 0.9 % — SIGNIFICANT CHANGE UP (ref 0–6)
GIANT PLATELETS BLD QL SMEAR: PRESENT — SIGNIFICANT CHANGE UP
GLUCOSE BLDC GLUCOMTR-MCNC: 113 MG/DL — HIGH (ref 70–99)
GLUCOSE BLDC GLUCOMTR-MCNC: 121 MG/DL — HIGH (ref 70–99)
GLUCOSE BLDC GLUCOMTR-MCNC: 145 MG/DL — HIGH (ref 70–99)
GLUCOSE BLDC GLUCOMTR-MCNC: 154 MG/DL — HIGH (ref 70–99)
GLUCOSE BLDC GLUCOMTR-MCNC: 384 MG/DL — HIGH (ref 70–99)
GLUCOSE SERPL-MCNC: 137 MG/DL — HIGH (ref 70–99)
HCT VFR BLD CALC: 43.6 % — SIGNIFICANT CHANGE UP (ref 39–50)
HGB BLD-MCNC: 14.3 G/DL — SIGNIFICANT CHANGE UP (ref 13–17)
LYMPHOCYTES # BLD AUTO: 1.37 K/UL — SIGNIFICANT CHANGE UP (ref 1–3.3)
LYMPHOCYTES # BLD AUTO: 13.9 % — SIGNIFICANT CHANGE UP (ref 13–44)
MACROCYTES BLD QL: SLIGHT — SIGNIFICANT CHANGE UP
MAGNESIUM SERPL-MCNC: 1.6 MG/DL — SIGNIFICANT CHANGE UP (ref 1.6–2.6)
MANUAL SMEAR VERIFICATION: SIGNIFICANT CHANGE UP
MCHC RBC-ENTMCNC: 28.2 PG — SIGNIFICANT CHANGE UP (ref 27–34)
MCHC RBC-ENTMCNC: 32.8 GM/DL — SIGNIFICANT CHANGE UP (ref 32–36)
MCV RBC AUTO: 86 FL — SIGNIFICANT CHANGE UP (ref 80–100)
METAMYELOCYTES # FLD: 0.9 % — HIGH (ref 0–0)
MICROCYTES BLD QL: SLIGHT — SIGNIFICANT CHANGE UP
MONOCYTES # BLD AUTO: 0.51 K/UL — SIGNIFICANT CHANGE UP (ref 0–0.9)
MONOCYTES NFR BLD AUTO: 5.2 % — SIGNIFICANT CHANGE UP (ref 2–14)
NEUTROPHILS # BLD AUTO: 7.43 K/UL — HIGH (ref 1.8–7.4)
NEUTROPHILS NFR BLD AUTO: 67.8 % — SIGNIFICANT CHANGE UP (ref 43–77)
NEUTS BAND # BLD: 7.8 % — SIGNIFICANT CHANGE UP (ref 0–8)
OVALOCYTES BLD QL SMEAR: SLIGHT — SIGNIFICANT CHANGE UP
PHOSPHATE SERPL-MCNC: 4.3 MG/DL — SIGNIFICANT CHANGE UP (ref 2.4–4.7)
PLAT MORPH BLD: NORMAL — SIGNIFICANT CHANGE UP
PLATELET # BLD AUTO: 194 K/UL — SIGNIFICANT CHANGE UP (ref 150–400)
PLATELET COUNT - ESTIMATE: NORMAL — SIGNIFICANT CHANGE UP
POIKILOCYTOSIS BLD QL AUTO: SLIGHT — SIGNIFICANT CHANGE UP
POLYCHROMASIA BLD QL SMEAR: SLIGHT — SIGNIFICANT CHANGE UP
POTASSIUM SERPL-MCNC: 5 MMOL/L — SIGNIFICANT CHANGE UP (ref 3.5–5.3)
POTASSIUM SERPL-SCNC: 5 MMOL/L — SIGNIFICANT CHANGE UP (ref 3.5–5.3)
RBC # BLD: 5.07 M/UL — SIGNIFICANT CHANGE UP (ref 4.2–5.8)
RBC # FLD: 13.7 % — SIGNIFICANT CHANGE UP (ref 10.3–14.5)
RBC BLD AUTO: NORMAL — SIGNIFICANT CHANGE UP
SODIUM SERPL-SCNC: 136 MMOL/L — SIGNIFICANT CHANGE UP (ref 135–145)
VARIANT LYMPHS # BLD: 3.5 % — SIGNIFICANT CHANGE UP (ref 0–6)
WBC # BLD: 9.83 K/UL — SIGNIFICANT CHANGE UP (ref 3.8–10.5)
WBC # FLD AUTO: 9.83 K/UL — SIGNIFICANT CHANGE UP (ref 3.8–10.5)

## 2021-06-29 RX ORDER — OXYCODONE HYDROCHLORIDE 5 MG/1
5 TABLET ORAL EVERY 6 HOURS
Refills: 0 | Status: DISCONTINUED | OUTPATIENT
Start: 2021-06-29 | End: 2021-07-01

## 2021-06-29 RX ORDER — OXYCODONE HYDROCHLORIDE 5 MG/1
10 TABLET ORAL EVERY 6 HOURS
Refills: 0 | Status: DISCONTINUED | OUTPATIENT
Start: 2021-06-29 | End: 2021-07-01

## 2021-06-29 RX ORDER — SODIUM CHLORIDE 9 MG/ML
1000 INJECTION, SOLUTION INTRAVENOUS ONCE
Refills: 0 | Status: COMPLETED | OUTPATIENT
Start: 2021-06-29 | End: 2021-06-29

## 2021-06-29 RX ADMIN — ATORVASTATIN CALCIUM 10 MILLIGRAM(S): 80 TABLET, FILM COATED ORAL at 21:16

## 2021-06-29 RX ADMIN — Medication 15 MILLIGRAM(S): at 12:46

## 2021-06-29 RX ADMIN — Medication 975 MILLIGRAM(S): at 11:26

## 2021-06-29 RX ADMIN — TRAMADOL HYDROCHLORIDE 50 MILLIGRAM(S): 50 TABLET ORAL at 17:11

## 2021-06-29 RX ADMIN — Medication 975 MILLIGRAM(S): at 06:07

## 2021-06-29 RX ADMIN — Medication 100 GRAM(S): at 02:32

## 2021-06-29 RX ADMIN — TRAMADOL HYDROCHLORIDE 50 MILLIGRAM(S): 50 TABLET ORAL at 16:41

## 2021-06-29 RX ADMIN — Medication 15 MILLIGRAM(S): at 06:07

## 2021-06-29 RX ADMIN — Medication 15 MILLIGRAM(S): at 21:17

## 2021-06-29 RX ADMIN — Medication 975 MILLIGRAM(S): at 17:11

## 2021-06-29 RX ADMIN — MONTELUKAST 10 MILLIGRAM(S): 4 TABLET, CHEWABLE ORAL at 10:57

## 2021-06-29 RX ADMIN — TRAMADOL HYDROCHLORIDE 25 MILLIGRAM(S): 50 TABLET ORAL at 03:40

## 2021-06-29 RX ADMIN — SODIUM CHLORIDE 100 MILLILITER(S): 9 INJECTION, SOLUTION INTRAVENOUS at 06:08

## 2021-06-29 RX ADMIN — Medication 975 MILLIGRAM(S): at 10:56

## 2021-06-29 RX ADMIN — Medication 975 MILLIGRAM(S): at 16:41

## 2021-06-29 RX ADMIN — LOSARTAN POTASSIUM 50 MILLIGRAM(S): 100 TABLET, FILM COATED ORAL at 06:08

## 2021-06-29 RX ADMIN — Medication 2: at 10:57

## 2021-06-29 RX ADMIN — ENOXAPARIN SODIUM 40 MILLIGRAM(S): 100 INJECTION SUBCUTANEOUS at 10:56

## 2021-06-29 RX ADMIN — OXYCODONE HYDROCHLORIDE 10 MILLIGRAM(S): 5 TABLET ORAL at 20:50

## 2021-06-29 RX ADMIN — TRAMADOL HYDROCHLORIDE 25 MILLIGRAM(S): 50 TABLET ORAL at 03:06

## 2021-06-29 RX ADMIN — SODIUM CHLORIDE 1000 MILLILITER(S): 9 INJECTION, SOLUTION INTRAVENOUS at 12:46

## 2021-06-29 RX ADMIN — Medication 100 GRAM(S): at 12:46

## 2021-06-29 RX ADMIN — TRAMADOL HYDROCHLORIDE 50 MILLIGRAM(S): 50 TABLET ORAL at 00:04

## 2021-06-29 RX ADMIN — TRAMADOL HYDROCHLORIDE 50 MILLIGRAM(S): 50 TABLET ORAL at 11:26

## 2021-06-29 RX ADMIN — Medication 15 MILLIGRAM(S): at 13:16

## 2021-06-29 RX ADMIN — TRAMADOL HYDROCHLORIDE 50 MILLIGRAM(S): 50 TABLET ORAL at 10:57

## 2021-06-29 RX ADMIN — Medication 15 MILLIGRAM(S): at 22:08

## 2021-06-29 RX ADMIN — OXYCODONE HYDROCHLORIDE 10 MILLIGRAM(S): 5 TABLET ORAL at 19:59

## 2021-06-29 NOTE — PROGRESS NOTE ADULT - ATTENDING COMMENTS
Patient seen and examined this morning. Doing well. Pain well controlled. Abdomen soft, non distended. Incision with dressing. Advance diet as tolerated. Continue em till tomorrow. Decrease fluids but monitor creatinine. Ambulate and IS.

## 2021-06-29 NOTE — PROGRESS NOTE ADULT - SUBJECTIVE AND OBJECTIVE BOX
HPI/OVERNIGHT EVENTS: No acute events overnight. POD1 colostomy reversal. Pain is well controlled. Dressing c/d/i. Tolerating diet without nausea or vomiting. Denies fever, chills, chest pain, SOB, dizziness, abd pain or any other concerning symptoms    Vital Signs Last 24 Hrs  T(C): 37 (29 Jun 2021 00:17), Max: 37 (29 Jun 2021 00:17)  T(F): 98.6 (29 Jun 2021 00:17), Max: 98.6 (29 Jun 2021 00:17)  HR: 79 (29 Jun 2021 00:17) (60 - 79)  BP: 119/71 (29 Jun 2021 00:17) (110/63 - 155/85)  BP(mean): --  RR: 18 (29 Jun 2021 00:17) (14 - 20)  SpO2: 94% (29 Jun 2021 00:17) (94% - 100%)    I&O's Detail    27 Jun 2021 07:01  -  28 Jun 2021 07:00  --------------------------------------------------------  IN:    Lactated Ringers: 1050 mL  Total IN: 1050 mL    OUT:    Colostomy (mL): 1000 mL  Total OUT: 1000 mL    Total NET: 50 mL      28 Jun 2021 07:01  -  29 Jun 2021 01:21  --------------------------------------------------------  IN:    Lactated Ringers: 100 mL    Oral Fluid: 120 mL  Total IN: 220 mL    OUT:    Indwelling Catheter - Urethral (mL): 85 mL  Total OUT: 85 mL    Total NET: 135 mL        Physical Exam:  Constitutional: patient resting comfortably in bed, in no acute distress  Respiratory: CTAB respirations are unlabored, no accessory muscle use, no conversational dyspnea  Cardiovascular: regular rate & rhythm   Gastrointestinal: Abdomen soft, non-tender, non-distended, no rebound tenderness / guarding  Incision/Wound: Clean, dry and intact  Musculoskeletal: No joint pain, swelling or deformity; no limitation of movement    LABS:                        15.5   6.71  )-----------( 182      ( 28 Jun 2021 06:25 )             48.0     06-28    142  |  107  |  19.4  ----------------------------<  121<H>  4.6   |  25.0  |  0.87    Ca    9.0      28 Jun 2021 06:25  Phos  3.1     06-28  Mg     1.8     06-28      PT/INR - ( 28 Jun 2021 06:28 )   PT: 12.9 sec;   INR: 1.12 ratio         PTT - ( 27 Jun 2021 10:36 )  PTT:34.9 sec      MEDICATIONS  (STANDING):  acetaminophen   Tablet .. 975 milliGRAM(s) Oral every 6 hours  atorvastatin 10 milliGRAM(s) Oral at bedtime  cefoTEtan  IVPB 2 Gram(s) IV Intermittent every 12 hours  dextrose 40% Gel 15 Gram(s) Oral once  dextrose 5%. 1000 milliLiter(s) (50 mL/Hr) IV Continuous <Continuous>  dextrose 50% Injectable 25 Gram(s) IV Push once  dextrose 50% Injectable 12.5 Gram(s) IV Push once  dextrose 50% Injectable 25 Gram(s) IV Push once  enoxaparin Injectable 40 milliGRAM(s) SubCutaneous daily  glucagon  Injectable 1 milliGRAM(s) IntraMuscular once  insulin lispro (ADMELOG) corrective regimen sliding scale   SubCutaneous three times a day before meals  insulin lispro (ADMELOG) corrective regimen sliding scale   SubCutaneous at bedtime  ketorolac   Injectable 15 milliGRAM(s) IV Push every 8 hours  lactated ringers. 1000 milliLiter(s) (100 mL/Hr) IV Continuous <Continuous>  losartan 50 milliGRAM(s) Oral daily  montelukast 10 milliGRAM(s) Oral daily    MEDICATIONS  (PRN):  ALBUTerol    0.083% 2.5 milliGRAM(s) Nebulizer every 6 hours PRN Shortness of Breath and/or Wheezing  ondansetron Injectable 4 milliGRAM(s) IV Push every 6 hours PRN Nausea  traMADol 25 milliGRAM(s) Oral every 4 hours PRN Moderate Pain (4 - 6)  traMADol 50 milliGRAM(s) Oral every 4 hours PRN Severe Pain (7 - 10)

## 2021-06-30 LAB
ANION GAP SERPL CALC-SCNC: 11 MMOL/L — SIGNIFICANT CHANGE UP (ref 5–17)
BUN SERPL-MCNC: 16.5 MG/DL — SIGNIFICANT CHANGE UP (ref 8–20)
CALCIUM SERPL-MCNC: 8.3 MG/DL — LOW (ref 8.6–10.2)
CHLORIDE SERPL-SCNC: 104 MMOL/L — SIGNIFICANT CHANGE UP (ref 98–107)
CO2 SERPL-SCNC: 23 MMOL/L — SIGNIFICANT CHANGE UP (ref 22–29)
CREAT SERPL-MCNC: 0.91 MG/DL — SIGNIFICANT CHANGE UP (ref 0.5–1.3)
GLUCOSE BLDC GLUCOMTR-MCNC: 106 MG/DL — HIGH (ref 70–99)
GLUCOSE BLDC GLUCOMTR-MCNC: 110 MG/DL — HIGH (ref 70–99)
GLUCOSE BLDC GLUCOMTR-MCNC: 143 MG/DL — HIGH (ref 70–99)
GLUCOSE BLDC GLUCOMTR-MCNC: 166 MG/DL — HIGH (ref 70–99)
GLUCOSE SERPL-MCNC: 108 MG/DL — HIGH (ref 70–99)
HCT VFR BLD CALC: 38.3 % — LOW (ref 39–50)
HGB BLD-MCNC: 12.7 G/DL — LOW (ref 13–17)
MAGNESIUM SERPL-MCNC: 1.5 MG/DL — LOW (ref 1.6–2.6)
MCHC RBC-ENTMCNC: 28.9 PG — SIGNIFICANT CHANGE UP (ref 27–34)
MCHC RBC-ENTMCNC: 33.2 GM/DL — SIGNIFICANT CHANGE UP (ref 32–36)
MCV RBC AUTO: 87.2 FL — SIGNIFICANT CHANGE UP (ref 80–100)
PHOSPHATE SERPL-MCNC: 2.1 MG/DL — LOW (ref 2.4–4.7)
PLATELET # BLD AUTO: 148 K/UL — LOW (ref 150–400)
POTASSIUM SERPL-MCNC: 4.3 MMOL/L — SIGNIFICANT CHANGE UP (ref 3.5–5.3)
POTASSIUM SERPL-SCNC: 4.3 MMOL/L — SIGNIFICANT CHANGE UP (ref 3.5–5.3)
RBC # BLD: 4.39 M/UL — SIGNIFICANT CHANGE UP (ref 4.2–5.8)
RBC # FLD: 13.9 % — SIGNIFICANT CHANGE UP (ref 10.3–14.5)
SODIUM SERPL-SCNC: 137 MMOL/L — SIGNIFICANT CHANGE UP (ref 135–145)
WBC # BLD: 8.12 K/UL — SIGNIFICANT CHANGE UP (ref 3.8–10.5)
WBC # FLD AUTO: 8.12 K/UL — SIGNIFICANT CHANGE UP (ref 3.8–10.5)

## 2021-06-30 RX ORDER — MAGNESIUM SULFATE 500 MG/ML
2 VIAL (ML) INJECTION ONCE
Refills: 0 | Status: COMPLETED | OUTPATIENT
Start: 2021-06-30 | End: 2021-06-30

## 2021-06-30 RX ADMIN — OXYCODONE HYDROCHLORIDE 10 MILLIGRAM(S): 5 TABLET ORAL at 14:14

## 2021-06-30 RX ADMIN — ENOXAPARIN SODIUM 40 MILLIGRAM(S): 100 INJECTION SUBCUTANEOUS at 11:05

## 2021-06-30 RX ADMIN — Medication 15 MILLIGRAM(S): at 05:22

## 2021-06-30 RX ADMIN — Medication 2: at 12:18

## 2021-06-30 RX ADMIN — Medication 975 MILLIGRAM(S): at 18:25

## 2021-06-30 RX ADMIN — Medication 975 MILLIGRAM(S): at 12:05

## 2021-06-30 RX ADMIN — OXYCODONE HYDROCHLORIDE 10 MILLIGRAM(S): 5 TABLET ORAL at 13:14

## 2021-06-30 RX ADMIN — MONTELUKAST 10 MILLIGRAM(S): 4 TABLET, CHEWABLE ORAL at 11:05

## 2021-06-30 RX ADMIN — Medication 975 MILLIGRAM(S): at 17:25

## 2021-06-30 RX ADMIN — Medication 50 GRAM(S): at 08:28

## 2021-06-30 RX ADMIN — Medication 975 MILLIGRAM(S): at 05:23

## 2021-06-30 RX ADMIN — OXYCODONE HYDROCHLORIDE 10 MILLIGRAM(S): 5 TABLET ORAL at 21:30

## 2021-06-30 RX ADMIN — Medication 62.5 MILLIMOLE(S): at 09:34

## 2021-06-30 RX ADMIN — Medication 975 MILLIGRAM(S): at 23:59

## 2021-06-30 RX ADMIN — Medication 975 MILLIGRAM(S): at 06:12

## 2021-06-30 RX ADMIN — ATORVASTATIN CALCIUM 10 MILLIGRAM(S): 80 TABLET, FILM COATED ORAL at 22:13

## 2021-06-30 RX ADMIN — OXYCODONE HYDROCHLORIDE 10 MILLIGRAM(S): 5 TABLET ORAL at 20:36

## 2021-06-30 RX ADMIN — OXYCODONE HYDROCHLORIDE 10 MILLIGRAM(S): 5 TABLET ORAL at 05:22

## 2021-06-30 RX ADMIN — OXYCODONE HYDROCHLORIDE 10 MILLIGRAM(S): 5 TABLET ORAL at 06:12

## 2021-06-30 RX ADMIN — Medication 975 MILLIGRAM(S): at 11:05

## 2021-06-30 RX ADMIN — Medication 15 MILLIGRAM(S): at 06:12

## 2021-06-30 RX ADMIN — LOSARTAN POTASSIUM 50 MILLIGRAM(S): 100 TABLET, FILM COATED ORAL at 05:22

## 2021-06-30 NOTE — PROGRESS NOTE ADULT - SUBJECTIVE AND OBJECTIVE BOX
HPI/OVERNIGHT EVENTS: No acute events overnight. POD2 colostomy reversal. Pain is well controlled. Dressing had to be changed overnight Tolerating diet without nausea or vomiting. Denies fever, chills, chest pain, SOB, dizziness, abd pain or any other concerning symptoms    Vital Signs Last 24 Hrs  T(C): 37 (29 Jun 2021 00:17), Max: 37 (29 Jun 2021 00:17)  T(F): 98.6 (29 Jun 2021 00:17), Max: 98.6 (29 Jun 2021 00:17)  HR: 79 (29 Jun 2021 00:17) (60 - 79)  BP: 119/71 (29 Jun 2021 00:17) (110/63 - 155/85)  BP(mean): --  RR: 18 (29 Jun 2021 00:17) (14 - 20)  SpO2: 94% (29 Jun 2021 00:17) (94% - 100%)    I&O's Detail    27 Jun 2021 07:01  -  28 Jun 2021 07:00  --------------------------------------------------------  IN:    Lactated Ringers: 1050 mL  Total IN: 1050 mL    OUT:    Colostomy (mL): 1000 mL  Total OUT: 1000 mL    Total NET: 50 mL      28 Jun 2021 07:01  -  29 Jun 2021 01:21  --------------------------------------------------------  IN:    Lactated Ringers: 100 mL    Oral Fluid: 120 mL  Total IN: 220 mL    OUT:    Indwelling Catheter - Urethral (mL): 85 mL  Total OUT: 85 mL    Total NET: 135 mL        Physical Exam:  Constitutional: patient resting comfortably in bed, in no acute distress  Respiratory: CTAB respirations are unlabored, no accessory muscle use, no conversational dyspnea  Cardiovascular: regular rate & rhythm   Gastrointestinal: Abdomen soft, non-tender, non-distended, no rebound tenderness / guarding  Incision/Wound: Clean, dry and intact, prevena in place with good seal.  Musculoskeletal: No joint pain, swelling or deformity; no limitation of movement    LABS:                        15.5   6.71  )-----------( 182      ( 28 Jun 2021 06:25 )             48.0     06-28    142  |  107  |  19.4  ----------------------------<  121<H>  4.6   |  25.0  |  0.87    Ca    9.0      28 Jun 2021 06:25  Phos  3.1     06-28  Mg     1.8     06-28      PT/INR - ( 28 Jun 2021 06:28 )   PT: 12.9 sec;   INR: 1.12 ratio         PTT - ( 27 Jun 2021 10:36 )  PTT:34.9 sec      MEDICATIONS  (STANDING):  acetaminophen   Tablet .. 975 milliGRAM(s) Oral every 6 hours  atorvastatin 10 milliGRAM(s) Oral at bedtime  cefoTEtan  IVPB 2 Gram(s) IV Intermittent every 12 hours  dextrose 40% Gel 15 Gram(s) Oral once  dextrose 5%. 1000 milliLiter(s) (50 mL/Hr) IV Continuous <Continuous>  dextrose 50% Injectable 25 Gram(s) IV Push once  dextrose 50% Injectable 12.5 Gram(s) IV Push once  dextrose 50% Injectable 25 Gram(s) IV Push once  enoxaparin Injectable 40 milliGRAM(s) SubCutaneous daily  glucagon  Injectable 1 milliGRAM(s) IntraMuscular once  insulin lispro (ADMELOG) corrective regimen sliding scale   SubCutaneous three times a day before meals  insulin lispro (ADMELOG) corrective regimen sliding scale   SubCutaneous at bedtime  ketorolac   Injectable 15 milliGRAM(s) IV Push every 8 hours  lactated ringers. 1000 milliLiter(s) (100 mL/Hr) IV Continuous <Continuous>  losartan 50 milliGRAM(s) Oral daily  montelukast 10 milliGRAM(s) Oral daily    MEDICATIONS  (PRN):  ALBUTerol    0.083% 2.5 milliGRAM(s) Nebulizer every 6 hours PRN Shortness of Breath and/or Wheezing  ondansetron Injectable 4 milliGRAM(s) IV Push every 6 hours PRN Nausea  traMADol 25 milliGRAM(s) Oral every 4 hours PRN Moderate Pain (4 - 6)  traMADol 50 milliGRAM(s) Oral every 4 hours PRN Severe Pain (7 - 10)

## 2021-06-30 NOTE — PROGRESS NOTE ADULT - ATTENDING COMMENTS
Moderate stool this morning.  Pain under control.    Vital Signs Last 24 Hrs  T(C): 36.9 (30 Jun 2021 10:58), Max: 37.5 (30 Jun 2021 04:52)  T(F): 98.5 (30 Jun 2021 10:58), Max: 99.5 (30 Jun 2021 04:52)  HR: 75 (30 Jun 2021 10:58) (64 - 79)  BP: 112/67 (30 Jun 2021 10:58) (101/63 - 146/75)  BP(mean): --  RR: 18 (30 Jun 2021 10:58) (16 - 18)  SpO2: 91% (30 Jun 2021 10:58) (91% - 94%)    Uo 1300/24h    Abd Nd/soft/mild incisional tenderness  dressings dry and intact                          12.7   8.12  )-----------( 148      ( 30 Jun 2021 06:11 )             38.3   06-30    137  |  104  |  16.5  ----------------------------<  108<H>  4.3   |  23.0  |  0.91    Ca    8.3<L>      30 Jun 2021 06:11  Phos  2.1     06-30  Mg     1.5     06-30      imp: POD#2 s/p colostomy reversal  --doing well.  with bowel function  --dc em  --replace Mg and phos  --home tomorrow

## 2021-07-01 ENCOUNTER — TRANSCRIPTION ENCOUNTER (OUTPATIENT)
Age: 56
End: 2021-07-01

## 2021-07-01 VITALS
DIASTOLIC BLOOD PRESSURE: 94 MMHG | SYSTOLIC BLOOD PRESSURE: 169 MMHG | OXYGEN SATURATION: 93 % | HEART RATE: 82 BPM | RESPIRATION RATE: 18 BRPM | TEMPERATURE: 99 F

## 2021-07-01 LAB
ANION GAP SERPL CALC-SCNC: 8 MMOL/L — SIGNIFICANT CHANGE UP (ref 5–17)
BASOPHILS # BLD AUTO: 0.06 K/UL — SIGNIFICANT CHANGE UP (ref 0–0.2)
BASOPHILS NFR BLD AUTO: 0.8 % — SIGNIFICANT CHANGE UP (ref 0–2)
BUN SERPL-MCNC: 10.9 MG/DL — SIGNIFICANT CHANGE UP (ref 8–20)
CALCIUM SERPL-MCNC: 8.4 MG/DL — LOW (ref 8.6–10.2)
CHLORIDE SERPL-SCNC: 104 MMOL/L — SIGNIFICANT CHANGE UP (ref 98–107)
CO2 SERPL-SCNC: 28 MMOL/L — SIGNIFICANT CHANGE UP (ref 22–29)
CREAT SERPL-MCNC: 0.76 MG/DL — SIGNIFICANT CHANGE UP (ref 0.5–1.3)
EOSINOPHIL # BLD AUTO: 0.2 K/UL — SIGNIFICANT CHANGE UP (ref 0–0.5)
EOSINOPHIL NFR BLD AUTO: 2.6 % — SIGNIFICANT CHANGE UP (ref 0–6)
GLUCOSE BLDC GLUCOMTR-MCNC: 106 MG/DL — HIGH (ref 70–99)
GLUCOSE BLDC GLUCOMTR-MCNC: 141 MG/DL — HIGH (ref 70–99)
GLUCOSE SERPL-MCNC: 116 MG/DL — HIGH (ref 70–99)
HCT VFR BLD CALC: 35.1 % — LOW (ref 39–50)
HGB BLD-MCNC: 12 G/DL — LOW (ref 13–17)
IMM GRANULOCYTES NFR BLD AUTO: 0.4 % — SIGNIFICANT CHANGE UP (ref 0–1.5)
LYMPHOCYTES # BLD AUTO: 1.98 K/UL — SIGNIFICANT CHANGE UP (ref 1–3.3)
LYMPHOCYTES # BLD AUTO: 25.5 % — SIGNIFICANT CHANGE UP (ref 13–44)
MAGNESIUM SERPL-MCNC: 1.6 MG/DL — LOW (ref 1.8–2.6)
MCHC RBC-ENTMCNC: 28.5 PG — SIGNIFICANT CHANGE UP (ref 27–34)
MCHC RBC-ENTMCNC: 34.2 GM/DL — SIGNIFICANT CHANGE UP (ref 32–36)
MCV RBC AUTO: 83.4 FL — SIGNIFICANT CHANGE UP (ref 80–100)
MONOCYTES # BLD AUTO: 0.56 K/UL — SIGNIFICANT CHANGE UP (ref 0–0.9)
MONOCYTES NFR BLD AUTO: 7.2 % — SIGNIFICANT CHANGE UP (ref 2–14)
NEUTROPHILS # BLD AUTO: 4.94 K/UL — SIGNIFICANT CHANGE UP (ref 1.8–7.4)
NEUTROPHILS NFR BLD AUTO: 63.5 % — SIGNIFICANT CHANGE UP (ref 43–77)
PHOSPHATE SERPL-MCNC: 2.1 MG/DL — LOW (ref 2.4–4.7)
PLATELET # BLD AUTO: 162 K/UL — SIGNIFICANT CHANGE UP (ref 150–400)
POTASSIUM SERPL-MCNC: 4.1 MMOL/L — SIGNIFICANT CHANGE UP (ref 3.5–5.3)
POTASSIUM SERPL-SCNC: 4.1 MMOL/L — SIGNIFICANT CHANGE UP (ref 3.5–5.3)
RBC # BLD: 4.21 M/UL — SIGNIFICANT CHANGE UP (ref 4.2–5.8)
RBC # FLD: 14 % — SIGNIFICANT CHANGE UP (ref 10.3–14.5)
SODIUM SERPL-SCNC: 140 MMOL/L — SIGNIFICANT CHANGE UP (ref 135–145)
WBC # BLD: 7.77 K/UL — SIGNIFICANT CHANGE UP (ref 3.8–10.5)
WBC # FLD AUTO: 7.77 K/UL — SIGNIFICANT CHANGE UP (ref 3.8–10.5)

## 2021-07-01 PROCEDURE — 88307 TISSUE EXAM BY PATHOLOGIST: CPT

## 2021-07-01 PROCEDURE — U0005: CPT

## 2021-07-01 PROCEDURE — 85027 COMPLETE CBC AUTOMATED: CPT

## 2021-07-01 PROCEDURE — 86900 BLOOD TYPING SEROLOGIC ABO: CPT

## 2021-07-01 PROCEDURE — 80053 COMPREHEN METABOLIC PANEL: CPT

## 2021-07-01 PROCEDURE — 86901 BLOOD TYPING SEROLOGIC RH(D): CPT

## 2021-07-01 PROCEDURE — 99285 EMERGENCY DEPT VISIT HI MDM: CPT | Mod: 25

## 2021-07-01 PROCEDURE — 86850 RBC ANTIBODY SCREEN: CPT

## 2021-07-01 PROCEDURE — 85025 COMPLETE CBC W/AUTO DIFF WBC: CPT

## 2021-07-01 PROCEDURE — 84100 ASSAY OF PHOSPHORUS: CPT

## 2021-07-01 PROCEDURE — 96374 THER/PROPH/DIAG INJ IV PUSH: CPT

## 2021-07-01 PROCEDURE — 93005 ELECTROCARDIOGRAM TRACING: CPT

## 2021-07-01 PROCEDURE — 83735 ASSAY OF MAGNESIUM: CPT

## 2021-07-01 PROCEDURE — 83690 ASSAY OF LIPASE: CPT

## 2021-07-01 PROCEDURE — 88304 TISSUE EXAM BY PATHOLOGIST: CPT

## 2021-07-01 PROCEDURE — 86769 SARS-COV-2 COVID-19 ANTIBODY: CPT

## 2021-07-01 PROCEDURE — 74177 CT ABD & PELVIS W/CONTRAST: CPT

## 2021-07-01 PROCEDURE — 36415 COLL VENOUS BLD VENIPUNCTURE: CPT

## 2021-07-01 PROCEDURE — C1889: CPT

## 2021-07-01 PROCEDURE — 85610 PROTHROMBIN TIME: CPT

## 2021-07-01 PROCEDURE — 82962 GLUCOSE BLOOD TEST: CPT

## 2021-07-01 PROCEDURE — 80048 BASIC METABOLIC PNL TOTAL CA: CPT

## 2021-07-01 PROCEDURE — 88305 TISSUE EXAM BY PATHOLOGIST: CPT

## 2021-07-01 PROCEDURE — U0003: CPT

## 2021-07-01 PROCEDURE — 85730 THROMBOPLASTIN TIME PARTIAL: CPT

## 2021-07-01 PROCEDURE — 83605 ASSAY OF LACTIC ACID: CPT

## 2021-07-01 RX ORDER — ACETAMINOPHEN 500 MG
3 TABLET ORAL
Qty: 0 | Refills: 0 | DISCHARGE
Start: 2021-07-01

## 2021-07-01 RX ORDER — POTASSIUM PHOSPHATE, MONOBASIC POTASSIUM PHOSPHATE, DIBASIC 236; 224 MG/ML; MG/ML
15 INJECTION, SOLUTION INTRAVENOUS ONCE
Refills: 0 | Status: COMPLETED | OUTPATIENT
Start: 2021-07-01 | End: 2021-07-01

## 2021-07-01 RX ORDER — MAGNESIUM SULFATE 500 MG/ML
2 VIAL (ML) INJECTION ONCE
Refills: 0 | Status: COMPLETED | OUTPATIENT
Start: 2021-07-01 | End: 2021-07-01

## 2021-07-01 RX ORDER — ACETAMINOPHEN 500 MG
3 TABLET ORAL
Qty: 120 | Refills: 0
Start: 2021-07-01 | End: 2021-07-10

## 2021-07-01 RX ORDER — OXYCODONE HYDROCHLORIDE 5 MG/1
5 TABLET ORAL
Qty: 15 | Refills: 0
Start: 2021-07-01

## 2021-07-01 RX ADMIN — OXYCODONE HYDROCHLORIDE 10 MILLIGRAM(S): 5 TABLET ORAL at 10:05

## 2021-07-01 RX ADMIN — POTASSIUM PHOSPHATE, MONOBASIC POTASSIUM PHOSPHATE, DIBASIC 62.5 MILLIMOLE(S): 236; 224 INJECTION, SOLUTION INTRAVENOUS at 08:57

## 2021-07-01 RX ADMIN — Medication 975 MILLIGRAM(S): at 06:00

## 2021-07-01 RX ADMIN — Medication 50 GRAM(S): at 08:57

## 2021-07-01 RX ADMIN — ENOXAPARIN SODIUM 40 MILLIGRAM(S): 100 INJECTION SUBCUTANEOUS at 11:01

## 2021-07-01 RX ADMIN — LOSARTAN POTASSIUM 50 MILLIGRAM(S): 100 TABLET, FILM COATED ORAL at 05:28

## 2021-07-01 RX ADMIN — Medication 975 MILLIGRAM(S): at 11:01

## 2021-07-01 RX ADMIN — Medication 975 MILLIGRAM(S): at 12:01

## 2021-07-01 RX ADMIN — MONTELUKAST 10 MILLIGRAM(S): 4 TABLET, CHEWABLE ORAL at 11:01

## 2021-07-01 RX ADMIN — OXYCODONE HYDROCHLORIDE 10 MILLIGRAM(S): 5 TABLET ORAL at 09:15

## 2021-07-01 RX ADMIN — Medication 975 MILLIGRAM(S): at 05:28

## 2021-07-01 RX ADMIN — Medication 975 MILLIGRAM(S): at 00:30

## 2021-07-01 NOTE — DISCHARGE NOTE PROVIDER - HOSPITAL COURSE
57 yo M with colostomy s/p Guanaco's in 2020 presenting with parastomal abdominal pain, no findings of obstruction, or strangulated/incarcerated hernia. elevated lipase >900, persistent abdominal pain around colostomy site likely secondary to parastomal hernia . admit patient to Colorectal Surgery service under Dr. Aguila with Pain control as needed  AM labs, recheck lipase , Consistent carb diet/LR@20 , Add on Monday 6/28 for open colostomy reversal with Dr. Aguila, Obtain medical clearance.  Patient taken to surgery 6/28/21 and under went successful colostomy reversal. Post operative course was uneventful diet was slowly advanced and tolerated and pain controlled and surgery site was clean and dry.  Discharge planninmg started 7/1/21 . 57 yo M with colostomy s/p Guanaco's in 2020 presenting with parastomal abdominal pain, no findings of obstruction, or strangulated/incarcerated hernia. elevated lipase >900, persistent abdominal pain around colostomy site likely secondary to parastomal hernia . admit patient to Colorectal Surgery service under Dr. Aguila with Pain control as needed follow up AM labs and lipase  admission orders .  Plan for surgery on Monday 6/28 for open colostomy reversal with Dr. Aguila, Obtain medical clearance.  Patient prepped for surgery and  patient taken to OR on 6/28/21 and underwent successful colostomy reversal.  Post operative course was uneventful started on diet was slowly advanced as tolerated,  and pain managed and controlled and with site clean and dry.  Discharge planning started 7/1/21 . 57 yo M with colostomy s/p Guanaco's in 2020 presenting with parastomal abdominal pain, no findings of obstruction, or strangulated/incarcerated hernia. elevated lipase >900, persistent abdominal pain around colostomy site likely secondary to parastomal hernia . admit patient to Colorectal Surgery service under Dr. Aguila with Pain control as needed follow up AM labs and lipase  admission orders .  Plan for surgery on Monday 6/28 for open colostomy reversal with Dr. Aguila, Obtain medical clearance.  Patient prepped for surgery and  patient taken to OR on 6/28/21 and underwent successful colostomy reversal.  Post operative course was uneventful started on diet was slowly advanced as tolerated,  and pain managed and controlled and with site clean and dry.

## 2021-07-01 NOTE — DISCHARGE NOTE NURSING/CASE MANAGEMENT/SOCIAL WORK - PATIENT PORTAL LINK FT
You can access the FollowMyHealth Patient Portal offered by [facility] by registering at the following website: [email or web address]. By joining [facility]’s FollowMyHealth portal, you will also be able to view your health information using other applications (apps) compatible with our system.

## 2021-07-01 NOTE — DISCHARGE NOTE NURSING/CASE MANAGEMENT/SOCIAL WORK - NSDCFUADDAPPT_GEN_ALL_CORE_FT
Call office Dr. Aguila ( [phone] ) to schedule follow up appointment for 7-10 days from discharge.

## 2021-07-01 NOTE — DISCHARGE NOTE PROVIDER - NSDCFUADDAPPT_GEN_ALL_CORE_FT
Call office Dr. Aguila ( [phone] ) to schedule follow up appointment for 7-10 days from discharge.

## 2021-07-01 NOTE — PROGRESS NOTE ADULT - ATTENDING COMMENTS
Patient seen and examined. POD 3 s/p open colostomy reversal. Doing well. Having bowel function. Pain well controlled. Incision looks clean and intact. Replacing electrolytes and ready for discharge home today.

## 2021-07-01 NOTE — PROGRESS NOTE ADULT - SUBJECTIVE AND OBJECTIVE BOX
HPI/OVERNIGHT EVENTS: NAEON. Patient feels well overall and has no new complaints. Pain controlled. Tolerating PO, voiding and passing flatus + BMs without issue. No f/c/n/v, chest pain, SOB, urinary symptoms, hematochezia, melena.    MEDICATIONS  (STANDING):  acetaminophen   Tablet .. 975 milliGRAM(s) Oral every 6 hours  atorvastatin 10 milliGRAM(s) Oral at bedtime  dextrose 40% Gel 15 Gram(s) Oral once  dextrose 5%. 1000 milliLiter(s) (50 mL/Hr) IV Continuous <Continuous>  dextrose 50% Injectable 25 Gram(s) IV Push once  dextrose 50% Injectable 12.5 Gram(s) IV Push once  dextrose 50% Injectable 25 Gram(s) IV Push once  enoxaparin Injectable 40 milliGRAM(s) SubCutaneous daily  glucagon  Injectable 1 milliGRAM(s) IntraMuscular once  insulin lispro (ADMELOG) corrective regimen sliding scale   SubCutaneous three times a day before meals  insulin lispro (ADMELOG) corrective regimen sliding scale   SubCutaneous at bedtime  losartan 50 milliGRAM(s) Oral daily  montelukast 10 milliGRAM(s) Oral daily    MEDICATIONS  (PRN):  ALBUTerol    0.083% 2.5 milliGRAM(s) Nebulizer every 6 hours PRN Shortness of Breath and/or Wheezing  ondansetron Injectable 4 milliGRAM(s) IV Push every 6 hours PRN Nausea  oxyCODONE    IR 5 milliGRAM(s) Oral every 6 hours PRN Moderate Pain (4 - 6)  oxyCODONE    IR 10 milliGRAM(s) Oral every 6 hours PRN Severe Pain (7 - 10)      Vital Signs Last 24 Hrs  T(C): 36.5 (30 Jun 2021 21:55), Max: 37.5 (30 Jun 2021 04:52)  T(F): 97.7 (30 Jun 2021 21:55), Max: 99.5 (30 Jun 2021 04:52)  HR: 76 (30 Jun 2021 21:55) (71 - 79)  BP: 152/79 (30 Jun 2021 21:55) (112/67 - 152/79)  BP(mean): --  RR: 17 (30 Jun 2021 21:55) (16 - 18)  SpO2: 91% (30 Jun 2021 21:55) (91% - 92%)    Gen: patient laying in bed, A&Ox3, NAD  HEENT: EOMI / PERRL b/l  Pulm: regular respiratory rate, no distress  CV: RRR  GI: Prevena vac in place over midline abdominal wound, good seal. Abdomen soft, NTND, without rebound or guarding  Neurological: no gross sensory / motor deficits  Ext: Warm, pink, no edema or lesions noted      I&O's Detail    29 Jun 2021 07:01  -  30 Jun 2021 07:00  --------------------------------------------------------  IN:    IV PiggyBack: 50 mL    Lactated Ringers: 2400 mL    multiple electrolytes Injection Type 1 Bolus: 1000 mL  Total IN: 3450 mL    OUT:    Indwelling Catheter - Urethral (mL): 1300 mL  Total OUT: 1300 mL    Total NET: 2150 mL      30 Jun 2021 07:01  -  01 Jul 2021 00:08  --------------------------------------------------------  IN:    IV PiggyBack: 50 mL    IV PiggyBack: 250 mL    Lactated Ringers: 200 mL    Oral Fluid: 240 mL  Total IN: 740 mL    OUT:    Voided (mL): 375 mL  Total OUT: 375 mL    Total NET: 365 mL          LABS:                        12.7   8.12  )-----------( 148      ( 30 Jun 2021 06:11 )             38.3     06-30    137  |  104  |  16.5  ----------------------------<  108<H>  4.3   |  23.0  |  0.91    Ca    8.3<L>      30 Jun 2021 06:11  Phos  2.1     06-30  Mg     1.5     06-30           HPI/OVERNIGHT EVENTS: NAEON. Patient feels well overall and has no new complaints. Pain controlled. Tolerating PO, voiding and passing flatus + BMs without issue. No f/c/n/v, chest pain, SOB, urinary symptoms, hematochezia, melena.    MEDICATIONS  (STANDING):  acetaminophen   Tablet .. 975 milliGRAM(s) Oral every 6 hours  atorvastatin 10 milliGRAM(s) Oral at bedtime  dextrose 40% Gel 15 Gram(s) Oral once  dextrose 5%. 1000 milliLiter(s) (50 mL/Hr) IV Continuous <Continuous>  dextrose 50% Injectable 25 Gram(s) IV Push once  dextrose 50% Injectable 12.5 Gram(s) IV Push once  dextrose 50% Injectable 25 Gram(s) IV Push once  enoxaparin Injectable 40 milliGRAM(s) SubCutaneous daily  glucagon  Injectable 1 milliGRAM(s) IntraMuscular once  insulin lispro (ADMELOG) corrective regimen sliding scale   SubCutaneous three times a day before meals  insulin lispro (ADMELOG) corrective regimen sliding scale   SubCutaneous at bedtime  losartan 50 milliGRAM(s) Oral daily  montelukast 10 milliGRAM(s) Oral daily    MEDICATIONS  (PRN):  ALBUTerol    0.083% 2.5 milliGRAM(s) Nebulizer every 6 hours PRN Shortness of Breath and/or Wheezing  ondansetron Injectable 4 milliGRAM(s) IV Push every 6 hours PRN Nausea  oxyCODONE    IR 5 milliGRAM(s) Oral every 6 hours PRN Moderate Pain (4 - 6)  oxyCODONE    IR 10 milliGRAM(s) Oral every 6 hours PRN Severe Pain (7 - 10)      Vital Signs Last 24 Hrs  T(C): 36.5 (30 Jun 2021 21:55), Max: 37.5 (30 Jun 2021 04:52)  T(F): 97.7 (30 Jun 2021 21:55), Max: 99.5 (30 Jun 2021 04:52)  HR: 76 (30 Jun 2021 21:55) (71 - 79)  BP: 152/79 (30 Jun 2021 21:55) (112/67 - 152/79)  RR: 17 (30 Jun 2021 21:55) (16 - 18)  SpO2: 91% (30 Jun 2021 21:55) (91% - 92%)    Gen: patient laying in bed, A&Ox3, NAD  HEENT: EOMI / PERRL b/l  Pulm: regular respiratory rate, no distress  CV: RRR  GI: Prevena vac in place over midline abdominal wound, good seal. Abdomen soft, NTND, without rebound or guarding  Neurological: no gross sensory / motor deficits  Ext: Warm, pink, no edema or lesions noted      I&O's Detail    29 Jun 2021 07:01  -  30 Jun 2021 07:00  --------------------------------------------------------  IN:    IV PiggyBack: 50 mL    Lactated Ringers: 2400 mL    multiple electrolytes Injection Type 1 Bolus: 1000 mL  Total IN: 3450 mL    OUT:    Indwelling Catheter - Urethral (mL): 1300 mL  Total OUT: 1300 mL    Total NET: 2150 mL      30 Jun 2021 07:01  -  01 Jul 2021 00:08  --------------------------------------------------------  IN:    IV PiggyBack: 50 mL    IV PiggyBack: 250 mL    Lactated Ringers: 200 mL    Oral Fluid: 240 mL  Total IN: 740 mL    OUT:    Voided (mL): 375 mL  Total OUT: 375 mL    Total NET: 365 mL          LABS:                        12.7   8.12  )-----------( 148      ( 30 Jun 2021 06:11 )             38.3     06-30    137  |  104  |  16.5  ----------------------------<  108<H>  4.3   |  23.0  |  0.91    Ca    8.3<L>      30 Jun 2021 06:11  Phos  2.1     06-30  Mg     1.5     06-30

## 2021-07-01 NOTE — DISCHARGE NOTE PROVIDER - CARE PROVIDER_API CALL
Odilia Aguila)  ColonRectal Surgery; Surgery  [address] [place] [address]  Phone: [phone]  Fax: [phone]  Follow Up Time: 1 week

## 2021-07-01 NOTE — PROGRESS NOTE ADULT - ASSESSMENT
55 yo M with colostomy s/p Guanaco's in 2020 presenting with parastomal abdominal pain, no findings of obstruction, or strangulated/incarcerated hernia. elevated lipase >900 that normalized, persistent abdominal pain around colostomy site likely secondary to parastomal hernia. Received wiggins prep for OR today. FS controlled.    Plan:  Add on for open colostomy reversal   Pain control as needed  AM labs, recheck lipase  Consistent carb diet/LR@75 - discontinue if lipase downtrending  Pending medical clearance, AM evaluation
A/P: 57 yo M with colostomy s/p Guanaco's in 2020, now POD3 s/p open colostomy reversal. Doing well, passed TOV, JEY resolving, tolerating PO without issue.     -Regular diet as tolerated  -Pain control  -OOB as tolerated  -F/U AM Labs  -Anticipate d/c today
55 yo M with colostomy s/p Guanaco's in 2020 presenting with parastomal abdominal pain, no findings of obstruction, or strangulated/incarcerated hernia. elevated lipase >900, persistent abdominal pain around colostomy site likely secondary to parastomal hernia    Plan:  Add on for open colostomy reversal tomorrow 6/28  Pain control as needed  AM labs, recheck lipase  Consistent carb diet/LR@75 - discontinue if lipase downtrending  Pending medical clearance, AM evaluation
57 yo M with colostomy s/p Guanaco's in 2020 presenting with parastomal abdominal pain, no findings of obstruction, or strangulated/incarcerated hernia. elevated lipase >900 that normalized, persistent abdominal pain around colostomy site likely secondary to parastomal hernia. POD1 colostomy reversal.    Plan:  Pain control as needed  AM labs  Consistent carb diet  Encourage IS and ambulation  
57 yo M with colostomy s/p Guanaco's in 2020 presenting with parastomal abdominal pain, no findings of obstruction, or strangulated/incarcerated hernia. elevated lipase >900 that normalized, persistent abdominal pain around colostomy site likely secondary to parastomal hernia. POD2 colostomy reversal, doing well, however developed JEY, continues on IVF.    Plan:  Pain control as needed  AM labs  Advance diet as tolerated  Encourage IS and ambulation  Monitor Cr  Monitor Urine output  TOV today

## 2021-07-01 NOTE — DISCHARGE NOTE PROVIDER - NSDCCPCAREPLAN_GEN_ALL_CORE_FT
PRINCIPAL DISCHARGE DIAGNOSIS  Diagnosis: History of colostomy reversal  Assessment and Plan of Treatment: Follow up: Please call and make an appointment with the Colorectal Surgery Clinic 10-14 days after discharge. Also, please call and make an appointment with your primary care physician as per your usual schedule.   Activity: May return to normal activities as tolerated, however refrain from heavy lifting > 10-15 lbs.  Diet: May continue regular diet.  Medications: Please take all medications listed on your discharge paperwork as prescribed. Pain medication has been prescribed for you. Please, take it as it has been prescribed, do not drive or operate heavy machinery while taking narcotics.  You are encouraged to take over-the-counter tylenol and/or ibuprofen for pain relief when you feel your pain no longer warrants the use of narcotic pain medications.  Wound Care: Please, keep wound site clean and dry. You may shower, but do not bathe.  Patient is advised to RETURN TO THE EMERGENCY DEPARTMENT for any of the following - worsening pain, fever/chills, nausea/vomiting, altered mental status, chest pain, shortness of breath, or any other new / worsening symptom.      SECONDARY DISCHARGE DIAGNOSES  Diagnosis: Pancreatitis  Assessment and Plan of Treatment:

## 2021-07-01 NOTE — DISCHARGE NOTE PROVIDER - NSDCMRMEDTOKEN_GEN_ALL_CORE_FT
albuterol 1.25 mg/3 mL (0.042%) inhalation solution: 3 milliliter(s) inhaled 3 times a day, As Needed  atorvastatin 10 mg oral tablet: 1 tab(s) orally once a day (at bedtime)  Breo Ellipta 100 mcg-25 mcg/inh inhalation powder: 1 puff(s) inhaled once a day, As Needed  Jardiance 25 mg oral tablet: 1 tab(s) orally once a day (in the morning)  LOSARTAN 50MG TABLETS:   metFORMIN 1000 mg oral tablet: 1 tab(s) orally 2 times a day  montelukast 10 mg oral tablet: 1 tab(s) orally once a day   acetaminophen 325 mg oral tablet: 3 tab(s) orally every 6 hours  albuterol 1.25 mg/3 mL (0.042%) inhalation solution: 3 milliliter(s) inhaled 3 times a day, As Needed  atorvastatin 10 mg oral tablet: 1 tab(s) orally once a day (at bedtime)  Breo Ellipta 100 mcg-25 mcg/inh inhalation powder: 1 puff(s) inhaled once a day, As Needed  Jardiance 25 mg oral tablet: 1 tab(s) orally once a day (in the morning)  LOSARTAN 50MG TABLETS:   metFORMIN 1000 mg oral tablet: 1 tab(s) orally 2 times a day  montelukast 10 mg oral tablet: 1 tab(s) orally once a day   acetaminophen 325 mg oral tablet: 3 tab(s) orally every 6 hours  albuterol 1.25 mg/3 mL (0.042%) inhalation solution: 3 milliliter(s) inhaled 3 times a day, As Needed  atorvastatin 10 mg oral tablet: 1 tab(s) orally once a day (at bedtime)  Breo Ellipta 100 mcg-25 mcg/inh inhalation powder: 1 puff(s) inhaled once a day, As Needed  Jardiance 25 mg oral tablet: 1 tab(s) orally once a day (in the morning)  LOSARTAN 50MG TABLETS:   metFORMIN 1000 mg oral tablet: 1 tab(s) orally 2 times a day  montelukast 10 mg oral tablet: 1 tab(s) orally once a day  oxyCODONE 5 mg oral capsule: 5 cap(s) orally 4 times a day MDD:4

## 2021-07-02 LAB — SURGICAL PATHOLOGY STUDY: SIGNIFICANT CHANGE UP

## 2021-07-02 NOTE — CDI QUERY NOTE - NSCDIOTHERTXTBX_GEN_ALL_CORE_HH
Pancreatitis was noted only in ED and dc note but was not mentioned in any other progress note. After study, can you please clarify the status of pancreatitis?  A.	Pancreatitis ruled out  B.	Pancreatitis present on admission  C.	Other, please specify  D.	Not clinically significant      Chart Documentation:    Lipase, Serum:  Lipase, Serum: 74 U/L (06.27.21 @ 10:36)   Lipase, Serum: 984 U/L (06.26.21 @ 11:13)         EXAM:  CT ABDOMEN AND PELVIS OC IC                        PROCEDURE DATE:  06/26/2021    IMPRESSION: Unchanged large parastomal hernia containing small bowel. Small fat-containing ventral hernias are noted.      Discharge Note Provider:  SECONDARY DISCHARGE DIAGNOSES  Diagnosis: Pancreatitis      ED STATDocs:  Impression:  Principal Discharge Dx Abdominal pain.     Secondary Discharge Dx Pancreatitis.      H&P Adult:  • Assessment	  57 yo M with colostomy s/p Guanaco's in 2020 presenting with parastomal abdominal pain, no findings of obstruction, or strangulated/incarcerated hernia. elevated lipase >900, persistent abdominal pain around colostomy site likely secondary to parastomal hernia  Plan:  Please admit patient to Colorectal Surgery service under Dr. Aguila  Pain control as needed  AM labs, recheck lipase  Consistent carb diet/LR@20

## 2021-07-02 NOTE — CDI QUERY NOTE - NSCDIOTHERTXTBX2_GEN_ALL_CORE_FT
[facility] uses the KDIGO criteria (0.3 increase in Crt or 1.5X the baseline retrospectively) to determine JEY.    Can you please clarify if this creatinine trend support a clinically significant diagnosis?  A. JEY, resolved  B. No JEY  C. Other, please specify  D. Not clinically significant      Creatinine Trend:  Creatinine, Serum: 0.76 mg/dL [0.50 - 1.30] (07-01-21)  Creatinine, Serum: 0.91 mg/dL [0.50 - 1.30] (06-30-21)  Creatinine, Serum: 1.20 mg/dL [0.50 - 1.30] (06-29-21)  Creatinine, Serum: 0.87 mg/dL [0.50 - 1.30] (06-28-21)  Creatinine, Serum: 0.77 mg/dL [0.50 - 1.30] (06-27-21)  Creatinine, Serum: 0.81 mg/dL [0.50 - 1.30] (06-26-21)      Chart Documentation:      lactated ringers. 1000 milliLiter(s) (75 mL/Hr) IV Continuous  dextrose 5%. 1000 milliLiter(s) (50 mL/Hr) IV Continuous

## 2021-07-04 NOTE — CHART NOTE - NSCHARTNOTEFT_GEN_A_CORE
Please note upon review of chart pancreatitis has been ruled out. Please note upon review of chart pancreatitis has been ruled out.    Please also note patient has following diagnosis upon review of chart:    1. JEY, resolved    Creatinine Trend:  Creatinine, Serum: 0.76 mg/dL [0.50 - 1.30] (07-01-21)  Creatinine, Serum: 0.91 mg/dL [0.50 - 1.30] (06-30-21)  Creatinine, Serum: 1.20 mg/dL [0.50 - 1.30] (06-29-21)  Creatinine, Serum: 0.87 mg/dL [0.50 - 1.30] (06-28-21)  Creatinine, Serum: 0.77 mg/dL [0.50 - 1.30] (06-27-21)  Creatinine, Serum: 0.81 mg/dL [0.50 - 1.30] (06-26-21)      Chart Documentation:      lactated ringers. 1000 milliLiter(s) (75 mL/Hr) IV Continuous  dextrose 5%. 1000 milliLiter(s) (50 mL/Hr) IV Continuous .

## 2021-07-06 ENCOUNTER — NON-APPOINTMENT (OUTPATIENT)
Age: 56
End: 2021-07-06

## 2021-07-14 ENCOUNTER — APPOINTMENT (OUTPATIENT)
Dept: COLORECTAL SURGERY | Facility: CLINIC | Age: 56
End: 2021-07-14
Payer: COMMERCIAL

## 2021-07-14 VITALS
HEART RATE: 100 BPM | RESPIRATION RATE: 16 BRPM | TEMPERATURE: 97.1 F | HEIGHT: 67 IN | BODY MASS INDEX: 26.68 KG/M2 | OXYGEN SATURATION: 93 % | WEIGHT: 170 LBS | DIASTOLIC BLOOD PRESSURE: 72 MMHG | SYSTOLIC BLOOD PRESSURE: 119 MMHG

## 2021-07-14 DIAGNOSIS — Z09 ENCOUNTER FOR FOLLOW-UP EXAMINATION AFTER COMPLETED TREATMENT FOR CONDITIONS OTHER THAN MALIGNANT NEOPLASM: ICD-10-CM

## 2021-07-14 PROCEDURE — 99024 POSTOP FOLLOW-UP VISIT: CPT

## 2021-07-14 PROCEDURE — 17250 CHEM CAUT OF GRANLTJ TISSUE: CPT | Mod: 78

## 2021-07-14 NOTE — PHYSICAL EXAM
[No Rash or Lesion] : No rash or lesion [Alert] : alert [Oriented to Person] : oriented to person [Oriented to Place] : oriented to place [Oriented to Time] : oriented to time [Calm] : calm [de-identified] : Midline incision clean/dry/intact; colostomy site healing well with minimal granulation tissue present; soft, NTND [de-identified] : No apparent distress [de-identified] : Normocephalic atraumatic [de-identified] : Moving all extremities x4

## 2021-07-14 NOTE — HISTORY OF PRESENT ILLNESS
[FreeTextEntry1] : Mr. De Anda presents to the office for consultation. In 3/29/2020, he underwent urgent Hartmanns procedure at [facility] for severe sigmoid diverticulitis.  By report, his hospital course was complicated by intra-abdominal abscess which was percutaneously drained.  He has since undergone screening colonoscopy with Dr. Varner in December/2020 and is eager for colostomy reversal.  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 De Anda returns to the office for follow-up.  His originally scheduled colostomy reversal in February had to be delayed secondary to elevated hemoglobin A1c of 11.8.  Since that time, he reports having improved his dietary choices and has been following with an endocrinologist.  Last hemoglobin A1c 3 weeks prior was noted to be 8.  He has another follow-up in 2 weeks time.  He also reports increased physical activity as well as weight loss.  He has not been smoking since 1 year prior and is not on any inhalers or nebulizers.  Room air sats are approximately 96% from 89% when he was actively smoking.\par \par 6/28/21 Mr. De Anda returns to the office for a postoperative visit after undergoing colostomy reversal and incisional hernia repair on 6/28/2021.  He had an uneventful postoperative course.  He reports that his energy levels are gradually returning as is his appetite.  He is eating 3 meals a day and passes bowel movements anywhere from 3-4 times daily.  His bowel movements are noted to be gradually solidifying.

## 2022-01-14 ENCOUNTER — TRANSCRIPTION ENCOUNTER (OUTPATIENT)
Age: 57
End: 2022-01-14

## 2022-05-04 ENCOUNTER — APPOINTMENT (OUTPATIENT)
Dept: COLORECTAL SURGERY | Facility: CLINIC | Age: 57
End: 2022-05-04
Payer: MEDICAID

## 2022-05-04 ENCOUNTER — NON-APPOINTMENT (OUTPATIENT)
Age: 57
End: 2022-05-04

## 2022-05-04 VITALS
HEIGHT: 67 IN | DIASTOLIC BLOOD PRESSURE: 82 MMHG | BODY MASS INDEX: 28.25 KG/M2 | WEIGHT: 180 LBS | HEART RATE: 67 BPM | RESPIRATION RATE: 14 BRPM | SYSTOLIC BLOOD PRESSURE: 159 MMHG | TEMPERATURE: 96.4 F | OXYGEN SATURATION: 92 %

## 2022-05-04 PROCEDURE — 99214 OFFICE O/P EST MOD 30 MIN: CPT

## 2022-05-04 RX ORDER — ANORECTAL OINTMENT 15.7; .44; 24; 20.6 G/100G; G/100G; G/100G; G/100G
0.44-20.6 OINTMENT TOPICAL
Qty: 1 | Refills: 0 | Status: ACTIVE | COMMUNITY
Start: 2022-05-04 | End: 1900-01-01

## 2022-05-04 RX ORDER — HYDROCORTISONE 25 MG/G
2.5 CREAM TOPICAL
Qty: 30 | Refills: 3 | Status: ACTIVE | COMMUNITY
Start: 2022-05-04 | End: 1900-01-01

## 2022-05-04 RX ORDER — ALUMINUM ZIRCONIUM TETRACHLOROHYDREX GLY 0.18 G/G
43 STICK TOPICAL
Qty: 1 | Refills: 3 | Status: ACTIVE | COMMUNITY
Start: 2022-05-04 | End: 1900-01-01

## 2022-05-04 NOTE — PHYSICAL EXAM
[Normal rectal exam] : exam was normal [Excoriation] : excoriations [Reduce Spontaneously] : a spontaneously reducible (grade II) [Skin Tags] : there were no residual hemorrhoidal skin tags seen [Normal] : was normal [None] : there was no rectal abscess [___ Anterior] : a [unfilled] ~Ucm rectal mass was present anteriorly [Gross Blood] : no gross blood [No Rash or Lesion] : No rash or lesion [Alert] : alert [Oriented to Person] : oriented to person [Oriented to Place] : oriented to place [Oriented to Time] : oriented to time [Calm] : calm [de-identified] : No apparent distress [de-identified] : Normocephalic atraumatic [de-identified] : Moving all extremities x4

## 2022-05-04 NOTE — HISTORY OF PRESENT ILLNESS
[FreeTextEntry1] : Mr. De Anda presents to the office for consultation. In 3/29/2020, he underwent urgent Hartmanns procedure at [facility] for severe sigmoid diverticulitis.  By report, his hospital course was complicated by intra-abdominal abscess which was percutaneously drained.  He has since undergone screening colonoscopy with Dr. Varner in December/2020 and is eager for colostomy reversal.  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 De Anda returns to the office for follow-up.  His originally scheduled colostomy reversal in February had to be delayed secondary to elevated hemoglobin A1c of 11.8.  Since that time, he reports having improved his dietary choices and has been following with an endocrinologist.  Last hemoglobin A1c 3 weeks prior was noted to be 8.  He has another follow-up in 2 weeks time.  He also reports increased physical activity as well as weight loss.  He has not been smoking since 1 year prior and is not on any inhalers or nebulizers.  Room air sats are approximately 96% from 89% when he was actively smoking.\par \par 6/28/21 Mr. De Anda returns to the office for a postoperative visit after undergoing colostomy reversal and incisional hernia repair on 6/28/2021.  He had an uneventful postoperative course.  He reports that his energy levels are gradually returning as is his appetite.  He is eating 3 meals a day and passes bowel movements anywhere from 3-4 times daily.  His bowel movements are noted to be gradually solidifying.\par \par 5/4/22 Mr. De Anda returns to the office for a followup visit.  He reports that over the last 1 to 2 months, despite passing soft stools 1-2 times daily without straining, he notes some fecal seepage which leads him to wipe excessively. As a result, he has been experiencing perianal irritation, burning, itching and discomfort.

## 2022-05-04 NOTE — ASSESSMENT
[FreeTextEntry1] : Mr. De Anda presents to the office for discussion of colostomy reversal after undergoing urgent Watson's procedure in March 2020 for perforated sigmoid diverticulitis.  He had a colonoscopy recently which did not reveal any polyps, masses or mucosal disease that would otherwise prohibit colostomy reversal. \par \par I also reviewed with him the three potential outcomes of colostomy reversal:\par 1) Inability to reverse the colostomy because of a frozen pelvis\par 2 ) ability to reverse the colostomy, but with an associated low pelvic anastomosis which would require a diverting ileostomy to avoid consequences of an increased chance of anastomotic leak with pelvic sepsis\par 3) reversal of the colostomy \par He is also aware of the need to perform an antibiotic and mechanical bowel prep. Duration of procedure, length of hospital stay, period of convalescence including limitations of activity were all reviewed.   All questions were answered to his satisfaction.  We will likely plan for February 22, 2021.\par Preoperatively, I will obtain a CT of the abdomen and pelvis to better appreciate his anatomy in anticipation of surgical planning.  He understands, and is agreeable.\par \par 4/14/21 Mr. De Anda presents to the office for follow-up in anticipation of colostomy reversal.  His hemoglobin A1c has gradually been improving with the help of an adjustment in dietary habits as well as an increase in physical activity.  He is scheduled to follow-up with his PCP and endocrinologist in the next 2 weeks.  We will tentatively place him back on the schedule for colostomy reversal for the end of April.  He is aware that if his hemoglobin A1c is not within acceptable limits, we will have to delay his surgery again.  The potential outcomes for colostomy reversal were once again reviewed.  All questions answered to his satisfaction.\par \par 7/14/21 Mr. De Anda presents to the office for a postoperative visit after undergoing colostomy reversal on 6/28/2021.  He is doing quite well in the postoperative time frame and was reassured of this.  He can return to a high-fiber diet.  Reassured that bowel movements will continue to solidify over time and decrease in frequency.  Advised to use an abdominal binder as he is at high risk for recurrent incisional hernia.  Okay to swim.  Follow-up as needed.\par \par 5/4/22 Mr. De Anda presents to the office for a followup visit. He appears to have fecal seepage from stools that are inadequately bulky. As a result, there is fecal seepage which leads him to constantly wipe the perianal region with resultant dermatitis. I discussed the causes for this dermatitis including over vigorous cleansing of the perianal skin, the misperception of hemorrhoidal burning as perianal skin irritation, and fecal leakage/seepage from loose stools. I have recommended a high fiber diet with daily water intake of at least 80 ounces a day. In addition, in order to allow for healing of the perianal skin, anal hygiene should be limited to rinsing the skin with warm water after a bowel movement, and then patting dry. Hydrocortisone cream 2.5% can be applied t.i.d. to facilitate healing. Finally, to address the pruritus symptoms that tend to awaken individuals at night, calmoseptine cream can be applied for symptomatic relief.\par

## 2023-02-07 NOTE — ED ADULT NURSE NOTE - NS TRANSFER PATIENT BELONGINGS
Lanette from [place] [phone]   Clothing

## 2023-08-17 NOTE — ASSESSMENT
[FreeTextEntry1] : Mr. De Anda presents to the office for discussion of colostomy reversal after undergoing urgent Watson's procedure in March 2020 for perforated sigmoid diverticulitis.  He had a colonoscopy recently which did not reveal any polyps, masses or mucosal disease that would otherwise prohibit colostomy reversal. \par \par I also reviewed with him the three potential outcomes of colostomy reversal:\par 1) Inability to reverse the colostomy because of a frozen pelvis\par 2 ) ability to reverse the colostomy, but with an associated low pelvic anastomosis which would require a diverting ileostomy to avoid consequences of an increased chance of anastomotic leak with pelvic sepsis\par 3) reversal of the colostomy \par He is also aware of the need to perform an antibiotic and mechanical bowel prep. Duration of procedure, length of hospital stay, period of convalescence including limitations of activity were all reviewed.   All questions were answered to his satisfaction.  We will likely plan for February 22, 2021.\par Preoperatively, I will obtain a CT of the abdomen and pelvis to better appreciate his anatomy in anticipation of surgical planning.  He understands, and is agreeable.\par \par 4/14/21 Mr. De Anda presents to the office for follow-up in anticipation of colostomy reversal.  His hemoglobin A1c has gradually been improving with the help of an adjustment in dietary habits as well as an increase in physical activity.  He is scheduled to follow-up with his PCP and endocrinologist in the next 2 weeks.  We will tentatively place him back on the schedule for colostomy reversal for the end of April.  He is aware that if his hemoglobin A1c is not within acceptable limits, we will have to delay his surgery again.  The potential outcomes for colostomy reversal were once again reviewed.  All questions answered to his satisfaction.\par \par 7/14/21 Mr. De Anda presents to the office for a postoperative visit after undergoing colostomy reversal on 6/28/2021.  He is doing quite well in the postoperative time frame and was reassured of this.  He can return to a high-fiber diet.  Reassured that bowel movements will continue to solidify over time and decrease in frequency.  Advised to use an abdominal binder as he is at high risk for recurrent incisional hernia.  Okay to swim.  Follow-up as needed. High Dose Vitamin A Counseling: Side effects reviewed, pt to contact office should one occur.

## 2023-08-18 ENCOUNTER — OUTPATIENT (OUTPATIENT)
Dept: OUTPATIENT SERVICES | Facility: HOSPITAL | Age: 58
LOS: 1 days | End: 2023-08-18
Payer: COMMERCIAL

## 2023-08-18 VITALS
OXYGEN SATURATION: 96 % | SYSTOLIC BLOOD PRESSURE: 138 MMHG | RESPIRATION RATE: 14 BRPM | HEART RATE: 60 BPM | TEMPERATURE: 97 F | DIASTOLIC BLOOD PRESSURE: 86 MMHG | WEIGHT: 188.05 LBS | HEIGHT: 67 IN

## 2023-08-18 DIAGNOSIS — Z98.890 OTHER SPECIFIED POSTPROCEDURAL STATES: Chronic | ICD-10-CM

## 2023-08-18 DIAGNOSIS — R74.8 ABNORMAL LEVELS OF OTHER SERUM ENZYMES: ICD-10-CM

## 2023-08-18 DIAGNOSIS — K40.90 UNILATERAL INGUINAL HERNIA, WITHOUT OBSTRUCTION OR GANGRENE, NOT SPECIFIED AS RECURRENT: Chronic | ICD-10-CM

## 2023-08-18 DIAGNOSIS — Z93.3 COLOSTOMY STATUS: Chronic | ICD-10-CM

## 2023-08-18 DIAGNOSIS — Z90.5 ACQUIRED ABSENCE OF KIDNEY: Chronic | ICD-10-CM

## 2023-08-18 LAB
A1C WITH ESTIMATED AVERAGE GLUCOSE RESULT: 7.7 % — HIGH (ref 4–5.6)
ALBUMIN SERPL ELPH-MCNC: 4.5 G/DL — SIGNIFICANT CHANGE UP (ref 3.3–5)
ALP SERPL-CCNC: 84 U/L — SIGNIFICANT CHANGE UP (ref 40–120)
ALT FLD-CCNC: 22 U/L — SIGNIFICANT CHANGE UP (ref 4–41)
ANION GAP SERPL CALC-SCNC: 14 MMOL/L — SIGNIFICANT CHANGE UP (ref 7–14)
AST SERPL-CCNC: 17 U/L — SIGNIFICANT CHANGE UP (ref 4–40)
BILIRUB SERPL-MCNC: 0.3 MG/DL — SIGNIFICANT CHANGE UP (ref 0.2–1.2)
BUN SERPL-MCNC: 35 MG/DL — HIGH (ref 7–23)
CALCIUM SERPL-MCNC: 9.1 MG/DL — SIGNIFICANT CHANGE UP (ref 8.4–10.5)
CHLORIDE SERPL-SCNC: 101 MMOL/L — SIGNIFICANT CHANGE UP (ref 98–107)
CO2 SERPL-SCNC: 23 MMOL/L — SIGNIFICANT CHANGE UP (ref 22–31)
CREAT SERPL-MCNC: 1.1 MG/DL — SIGNIFICANT CHANGE UP (ref 0.5–1.3)
EGFR: 79 ML/MIN/1.73M2 — SIGNIFICANT CHANGE UP
ESTIMATED AVERAGE GLUCOSE: 174 — SIGNIFICANT CHANGE UP
GLUCOSE SERPL-MCNC: 123 MG/DL — HIGH (ref 70–99)
HCT VFR BLD CALC: 47.9 % — SIGNIFICANT CHANGE UP (ref 39–50)
HGB BLD-MCNC: 15.6 G/DL — SIGNIFICANT CHANGE UP (ref 13–17)
MCHC RBC-ENTMCNC: 28.2 PG — SIGNIFICANT CHANGE UP (ref 27–34)
MCHC RBC-ENTMCNC: 32.6 GM/DL — SIGNIFICANT CHANGE UP (ref 32–36)
MCV RBC AUTO: 86.5 FL — SIGNIFICANT CHANGE UP (ref 80–100)
NRBC # BLD: 0 /100 WBCS — SIGNIFICANT CHANGE UP (ref 0–0)
NRBC # FLD: 0 K/UL — SIGNIFICANT CHANGE UP (ref 0–0)
PLATELET # BLD AUTO: 240 K/UL — SIGNIFICANT CHANGE UP (ref 150–400)
POTASSIUM SERPL-MCNC: 4.7 MMOL/L — SIGNIFICANT CHANGE UP (ref 3.5–5.3)
POTASSIUM SERPL-SCNC: 4.7 MMOL/L — SIGNIFICANT CHANGE UP (ref 3.5–5.3)
PROT SERPL-MCNC: 7.4 G/DL — SIGNIFICANT CHANGE UP (ref 6–8.3)
RBC # BLD: 5.54 M/UL — SIGNIFICANT CHANGE UP (ref 4.2–5.8)
RBC # FLD: 13.7 % — SIGNIFICANT CHANGE UP (ref 10.3–14.5)
SODIUM SERPL-SCNC: 138 MMOL/L — SIGNIFICANT CHANGE UP (ref 135–145)
WBC # BLD: 8.3 K/UL — SIGNIFICANT CHANGE UP (ref 3.8–10.5)
WBC # FLD AUTO: 8.3 K/UL — SIGNIFICANT CHANGE UP (ref 3.8–10.5)

## 2023-08-18 PROCEDURE — 93010 ELECTROCARDIOGRAM REPORT: CPT

## 2023-08-18 RX ORDER — ATORVASTATIN CALCIUM 80 MG/1
1 TABLET, FILM COATED ORAL
Qty: 0 | Refills: 0 | DISCHARGE

## 2023-08-18 RX ORDER — MONTELUKAST 4 MG/1
1 TABLET, CHEWABLE ORAL
Qty: 0 | Refills: 0 | DISCHARGE

## 2023-08-18 RX ORDER — METFORMIN HYDROCHLORIDE 850 MG/1
1 TABLET ORAL
Qty: 0 | Refills: 0 | DISCHARGE

## 2023-08-18 RX ORDER — ALBUTEROL 90 UG/1
3 AEROSOL, METERED ORAL
Qty: 0 | Refills: 0 | DISCHARGE

## 2023-08-18 RX ORDER — LOSARTAN POTASSIUM 100 MG/1
0 TABLET, FILM COATED ORAL
Qty: 0 | Refills: 1 | DISCHARGE

## 2023-08-18 RX ORDER — FLUTICASONE FUROATE AND VILANTEROL TRIFENATATE 100; 25 UG/1; UG/1
1 POWDER RESPIRATORY (INHALATION)
Qty: 0 | Refills: 0 | DISCHARGE

## 2023-08-18 NOTE — H&P PST ADULT - RX
cpap but state does not use since weight loss, lost 30 lbs admits to noncompliance with cpap machine

## 2023-08-18 NOTE — H&P PST ADULT - CARDIOVASCULAR COMMENTS
Finasteride Male Counseling: Finasteride Counseling:  I discussed with the patient the risks of use of finasteride including but not limited to decreased libido, decreased ejaculate volume, gynecomastia, and depression. Women should not handle medication.  All of the patient's questions and concerns were addressed. Finasteride Counseling:  I discussed with the patient the risks of use of finasteride including but not limited to decreased libido, decreased ejaculate volume, gynecomastia, and depression. Women should not handle medication.  All of the patient's questions and concerns were addressed. able to climb a flight of stairs

## 2023-08-18 NOTE — H&P PST ADULT - NSICDXPASTMEDICALHX_GEN_ALL_CORE_FT
PAST MEDICAL HISTORY:  Asthma     Current smoker     Diabetes mellitus type 2    GERD (gastroesophageal reflux disease)     History of colon resection     History of diverticulitis     Hyperlipidemia     Lung nodule Two small solid nodules (0.5cm) are noted in the right upper and left lower lobes.    Sleep apnea resloved after wieght loss     PAST MEDICAL HISTORY:  Abnormal liver enzymes     Asthma     Diabetes mellitus type 2    GERD (gastroesophageal reflux disease)     History of diverticulitis     History of substance abuse     Hyperlipidemia     Lung nodule Two small solid nodules (0.5cm) are noted in the right upper and left lower lobes.    Sleep apnea

## 2023-08-18 NOTE — H&P PST ADULT - CARDIOVASCULAR
negative details… normal/regular rate and rhythm/S1 S2 present/no gallops/no rub/no murmur/no JVD/normal PMI/no pedal edema

## 2023-08-18 NOTE — H&P PST ADULT - PROBLEM SELECTOR PLAN 1
Pt scheduled for endoscopic US on 8/30/2023.  labs done results pending, ekg done.  Preop teaching done, pt able to verbalize understanding.   medication day of procedure- trelegy ellipta, albuterol, losartan, pepcid   dm-  no diabetic medications the day of the procedure, Jardiance hold 72 hrs prior to the procedure last dose 8/26/2023.    anesthesia-  sleep apnea   [facility]st request   [facility]

## 2023-08-18 NOTE — H&P PST ADULT - GASTROINTESTINAL COMMENTS
colostomy, incision hernia noted colostomy bag on the left side of the abdomen, draining soft stool, noted a incisional/ventral hernia

## 2023-08-18 NOTE — H&P PST ADULT - GASTROINTESTINAL
details… normal/soft/nontender/nondistended/normal active bowel sounds/no guarding/no rigidity/no organomegaly/no palpable huan/no masses palpable

## 2023-08-18 NOTE — H&P PST ADULT - HISTORY OF PRESENT ILLNESS
59y/o male scheduled for endoscopic US on 8/30/2023.  Pt states, "has elevated lipase for the past year, US shows fatty liver.  now having further evaluation."

## 2023-08-18 NOTE — H&P PST ADULT - NSICDXPASTSURGICALHX_GEN_ALL_CORE_FT
PAST SURGICAL HISTORY:  H/O colonoscopy 6/2019    History of endoscopy 6/2019    Inguinal hernia left    S/P arthroscopy left, torn meniscus 25yrs ago    S/p nephrectomy left, congenital defect  9y/o    Status post colostomy      PAST SURGICAL HISTORY:  H/O colonoscopy 6/2019    History of colostomy reversal     History of endoscopy 6/2019    Inguinal hernia left    S/P arthroscopy left, torn meniscus 25yrs ago    S/p nephrectomy left, congenital defect  9y/o    Status post colostomy     Status post Guanaco procedure

## 2023-08-30 ENCOUNTER — APPOINTMENT (OUTPATIENT)
Dept: GASTROENTEROLOGY | Facility: HOSPITAL | Age: 58
End: 2023-08-30

## 2023-08-30 ENCOUNTER — RESULT REVIEW (OUTPATIENT)
Age: 58
End: 2023-08-30

## 2023-08-30 ENCOUNTER — OUTPATIENT (OUTPATIENT)
Dept: OUTPATIENT SERVICES | Facility: HOSPITAL | Age: 58
LOS: 1 days | Discharge: HOME | End: 2023-08-30
Payer: COMMERCIAL

## 2023-08-30 VITALS
OXYGEN SATURATION: 98 % | SYSTOLIC BLOOD PRESSURE: 115 MMHG | RESPIRATION RATE: 13 BRPM | DIASTOLIC BLOOD PRESSURE: 71 MMHG | WEIGHT: 181 LBS | HEART RATE: 60 BPM | TEMPERATURE: 97 F | HEIGHT: 67 IN

## 2023-08-30 VITALS
OXYGEN SATURATION: 96 % | DIASTOLIC BLOOD PRESSURE: 89 MMHG | RESPIRATION RATE: 20 BRPM | SYSTOLIC BLOOD PRESSURE: 111 MMHG | HEART RATE: 66 BPM

## 2023-08-30 DIAGNOSIS — Z98.890 OTHER SPECIFIED POSTPROCEDURAL STATES: Chronic | ICD-10-CM

## 2023-08-30 DIAGNOSIS — Z93.3 COLOSTOMY STATUS: Chronic | ICD-10-CM

## 2023-08-30 DIAGNOSIS — K40.90 UNILATERAL INGUINAL HERNIA, WITHOUT OBSTRUCTION OR GANGRENE, NOT SPECIFIED AS RECURRENT: Chronic | ICD-10-CM

## 2023-08-30 DIAGNOSIS — Z90.5 ACQUIRED ABSENCE OF KIDNEY: Chronic | ICD-10-CM

## 2023-08-30 DIAGNOSIS — R74.8 ABNORMAL LEVELS OF OTHER SERUM ENZYMES: ICD-10-CM

## 2023-08-30 LAB
GLUCOSE BLDC GLUCOMTR-MCNC: 165 MG/DL — HIGH (ref 70–99)
GLUCOSE BLDC GLUCOMTR-MCNC: 185 MG/DL — HIGH (ref 70–99)

## 2023-08-30 PROCEDURE — 88305 TISSUE EXAM BY PATHOLOGIST: CPT | Mod: 26

## 2023-08-30 PROCEDURE — 88342 IMHCHEM/IMCYTCHM 1ST ANTB: CPT | Mod: 26

## 2023-08-30 PROCEDURE — 43237 ENDOSCOPIC US EXAM ESOPH: CPT

## 2023-08-30 PROCEDURE — 43239 EGD BIOPSY SINGLE/MULTIPLE: CPT

## 2023-08-30 NOTE — ASU PREOP CHECKLIST - PATIENT'S PERSONAL PROPERTY REMOVED
 Number of children: N/A    Years of education: N/A     Occupational History    Not on file. Social History Main Topics    Smoking status: Former Smoker     Quit date: 7/21/2012    Smokeless tobacco: Never Used    Alcohol use 1.0 oz/week     2 Standard drinks or equivalent per week      Comment: socially     Drug use: No    Sexual activity: Yes     Partners: Male      Comment:       Other Topics Concern    Not on file     Social History Narrative    No narrative on file     Family History   Problem Relation Age of Onset    Cancer Mother          PHYSICAL EXAM:      BP (!) 152/89   Pulse 98   Temp 97.2 °F (36.2 °C) (Temporal)   Resp 16   Ht 5' 2\" (1.575 m)   Wt 179 lb (81.2 kg)   SpO2 97%   BMI 32.74 kg/m²  I        Heart:normal    Lungs: normal    Abdomen: normal      ASA Grade:  See anesthesia note      ASSESSMENT AND PLAN:    1. Procedure options, risks and benefits reviewed with patient and expresses understanding.
glasses

## 2023-08-30 NOTE — ASU PATIENT PROFILE, ADULT - FALL HARM RISK - UNIVERSAL INTERVENTIONS
Bed in lowest position, wheels locked, appropriate side rails in place/Call bell, personal items and telephone in reach/Instruct patient to call for assistance before getting out of bed or chair/Non-slip footwear when patient is out of bed/[place] to call system/Physically safe environment - no spills, clutter or unnecessary equipment/Purposeful Proactive Rounding/Room/bathroom lighting operational, light cord in reach

## 2023-08-30 NOTE — ASU PATIENT PROFILE, ADULT - NSICDXPASTSURGICALHX_GEN_ALL_CORE_FT
PAST SURGICAL HISTORY:  H/O colonoscopy 6/2019    History of colostomy reversal     History of endoscopy 6/2019    Inguinal hernia left    S/P arthroscopy left, torn meniscus 25yrs ago    S/p nephrectomy left, congenital defect  7y/o    Status post colostomy     Status post Guanaco procedure

## 2023-08-30 NOTE — ASU PATIENT PROFILE, ADULT - NSICDXPASTMEDICALHX_GEN_ALL_CORE_FT
PAST MEDICAL HISTORY:  Abnormal liver enzymes     Asthma     Diabetes mellitus type 2    GERD (gastroesophageal reflux disease)     History of diverticulitis     History of substance abuse     Hyperlipidemia     Lung nodule Two small solid nodules (0.5cm) are noted in the right upper and left lower lobes.    Sleep apnea

## 2023-09-01 LAB — SURGICAL PATHOLOGY STUDY: SIGNIFICANT CHANGE UP

## 2023-09-02 ENCOUNTER — NON-APPOINTMENT (OUTPATIENT)
Age: 58
End: 2023-09-02

## 2023-11-02 ENCOUNTER — APPOINTMENT (OUTPATIENT)
Dept: GASTROENTEROLOGY | Facility: CLINIC | Age: 58
End: 2023-11-02
Payer: COMMERCIAL

## 2023-11-02 VITALS
HEART RATE: 76 BPM | DIASTOLIC BLOOD PRESSURE: 83 MMHG | HEIGHT: 67 IN | WEIGHT: 187 LBS | BODY MASS INDEX: 29.35 KG/M2 | OXYGEN SATURATION: 95 % | SYSTOLIC BLOOD PRESSURE: 135 MMHG

## 2023-11-02 DIAGNOSIS — R19.5 OTHER FECAL ABNORMALITIES: ICD-10-CM

## 2023-11-02 DIAGNOSIS — R10.31 RIGHT LOWER QUADRANT PAIN: ICD-10-CM

## 2023-11-02 DIAGNOSIS — R15.1 FECAL SMEARING: ICD-10-CM

## 2023-11-02 DIAGNOSIS — Z43.3 ENCOUNTER FOR ATTENTION TO COLOSTOMY: ICD-10-CM

## 2023-11-02 DIAGNOSIS — K86.2 CYST OF PANCREAS: ICD-10-CM

## 2023-11-02 DIAGNOSIS — K63.5 POLYP OF COLON: ICD-10-CM

## 2023-11-02 DIAGNOSIS — L29.0 PRURITUS ANI: ICD-10-CM

## 2023-11-02 DIAGNOSIS — Z87.898 PERSONAL HISTORY OF OTHER SPECIFIED CONDITIONS: ICD-10-CM

## 2023-11-02 DIAGNOSIS — Z87.19 PERSONAL HISTORY OF OTHER DISEASES OF THE DIGESTIVE SYSTEM: ICD-10-CM

## 2023-11-02 PROCEDURE — 99214 OFFICE O/P EST MOD 30 MIN: CPT

## 2023-11-02 RX ORDER — SODIUM SULFATE, POTASSIUM SULFATE AND MAGNESIUM SULFATE 1.6; 3.13; 17.5 G/177ML; G/177ML; G/177ML
17.5-3.13-1.6 SOLUTION ORAL
Qty: 1 | Refills: 0 | Status: ACTIVE | COMMUNITY
Start: 2023-11-02 | End: 1900-01-01

## 2023-11-09 PROBLEM — R74.8 ABNORMAL LEVELS OF OTHER SERUM ENZYMES: Chronic | Status: ACTIVE | Noted: 2023-08-18

## 2023-11-09 PROBLEM — F19.11 OTHER PSYCHOACTIVE SUBSTANCE ABUSE, IN REMISSION: Chronic | Status: ACTIVE | Noted: 2023-08-18

## 2023-11-09 PROBLEM — G47.30 SLEEP APNEA, UNSPECIFIED: Chronic | Status: ACTIVE | Noted: 2023-08-18

## 2023-11-20 ENCOUNTER — OUTPATIENT (OUTPATIENT)
Dept: OUTPATIENT SERVICES | Facility: HOSPITAL | Age: 58
LOS: 1 days | End: 2023-11-20
Payer: COMMERCIAL

## 2023-11-20 ENCOUNTER — APPOINTMENT (OUTPATIENT)
Dept: MRI IMAGING | Facility: CLINIC | Age: 58
End: 2023-11-20
Payer: COMMERCIAL

## 2023-11-20 DIAGNOSIS — Z98.890 OTHER SPECIFIED POSTPROCEDURAL STATES: Chronic | ICD-10-CM

## 2023-11-20 DIAGNOSIS — Z90.5 ACQUIRED ABSENCE OF KIDNEY: Chronic | ICD-10-CM

## 2023-11-20 DIAGNOSIS — Z93.3 COLOSTOMY STATUS: Chronic | ICD-10-CM

## 2023-11-20 DIAGNOSIS — K40.90 UNILATERAL INGUINAL HERNIA, WITHOUT OBSTRUCTION OR GANGRENE, NOT SPECIFIED AS RECURRENT: Chronic | ICD-10-CM

## 2023-11-20 DIAGNOSIS — K63.5 POLYP OF COLON: ICD-10-CM

## 2023-11-20 PROCEDURE — 74183 MRI ABD W/O CNTR FLWD CNTR: CPT

## 2023-11-20 PROCEDURE — A9585: CPT

## 2023-11-20 PROCEDURE — 74183 MRI ABD W/O CNTR FLWD CNTR: CPT | Mod: 26

## 2023-12-07 NOTE — ASU PATIENT PROFILE, ADULT - NSICDXPASTSURGICALHX_GEN_ALL_CORE_FT
PAST SURGICAL HISTORY:  H/O colonoscopy 6/2019    History of colostomy reversal     History of endoscopy 6/2019    Inguinal hernia left    S/P arthroscopy left, torn meniscus 25yrs ago    S/p nephrectomy left, congenital defect  7y/o    Status post colostomy     Status post Guanaco procedure      PAST SURGICAL HISTORY:  H/O colonoscopy 6/2019    History of colostomy reversal     History of endoscopy 6/2019    Inguinal hernia left    S/P arthroscopy left, torn meniscus 25yrs ago    S/p nephrectomy left, congenital defect  9y/o    Status post colostomy     Status post Guanaco procedure

## 2023-12-07 NOTE — ASU PATIENT PROFILE, ADULT - FALL HARM RISK - UNIVERSAL INTERVENTIONS
Bed in lowest position, wheels locked, appropriate side rails in place/Call bell, personal items and telephone in reach/Instruct patient to call for assistance before getting out of bed or chair/Non-slip footwear when patient is out of bed/[place] to call system/Physically safe environment - no spills, clutter or unnecessary equipment/Purposeful Proactive Rounding/Room/bathroom lighting operational, light cord in reach Bed in lowest position, wheels locked, appropriate side rails in place/Call bell, personal items and telephone in reach/Instruct patient to call for assistance before getting out of bed or chair/Non-slip footwear when patient is out of bed/[place] to call system/Physically safe environment - no spills, clutter or unnecessary equipment/Purposeful Proactive Rounding/Room/bathroom lighting operational, light cord in reach

## 2023-12-08 ENCOUNTER — RESULT REVIEW (OUTPATIENT)
Age: 58
End: 2023-12-08

## 2023-12-08 ENCOUNTER — OUTPATIENT (OUTPATIENT)
Dept: OUTPATIENT SERVICES | Facility: HOSPITAL | Age: 58
LOS: 1 days | Discharge: HOME | End: 2023-12-08
Payer: COMMERCIAL

## 2023-12-08 ENCOUNTER — APPOINTMENT (OUTPATIENT)
Dept: GASTROENTEROLOGY | Facility: HOSPITAL | Age: 58
End: 2023-12-08

## 2023-12-08 VITALS
DIASTOLIC BLOOD PRESSURE: 76 MMHG | RESPIRATION RATE: 20 BRPM | OXYGEN SATURATION: 95 % | HEART RATE: 73 BPM | SYSTOLIC BLOOD PRESSURE: 119 MMHG

## 2023-12-08 VITALS
DIASTOLIC BLOOD PRESSURE: 81 MMHG | TEMPERATURE: 97 F | WEIGHT: 188.94 LBS | HEIGHT: 68 IN | HEART RATE: 67 BPM | SYSTOLIC BLOOD PRESSURE: 136 MMHG | OXYGEN SATURATION: 94 % | RESPIRATION RATE: 17 BRPM

## 2023-12-08 DIAGNOSIS — Z98.890 OTHER SPECIFIED POSTPROCEDURAL STATES: Chronic | ICD-10-CM

## 2023-12-08 DIAGNOSIS — Z93.3 COLOSTOMY STATUS: Chronic | ICD-10-CM

## 2023-12-08 DIAGNOSIS — Z90.5 ACQUIRED ABSENCE OF KIDNEY: Chronic | ICD-10-CM

## 2023-12-08 DIAGNOSIS — K40.90 UNILATERAL INGUINAL HERNIA, WITHOUT OBSTRUCTION OR GANGRENE, NOT SPECIFIED AS RECURRENT: Chronic | ICD-10-CM

## 2023-12-08 DIAGNOSIS — K63.5 POLYP OF COLON: ICD-10-CM

## 2023-12-08 LAB
GLUCOSE BLDC GLUCOMTR-MCNC: 121 MG/DL — HIGH (ref 70–99)
GLUCOSE BLDC GLUCOMTR-MCNC: 121 MG/DL — HIGH (ref 70–99)

## 2023-12-08 PROCEDURE — 45385 COLONOSCOPY W/LESION REMOVAL: CPT

## 2023-12-08 PROCEDURE — 45380 COLONOSCOPY AND BIOPSY: CPT | Mod: 59

## 2023-12-08 PROCEDURE — 88305 TISSUE EXAM BY PATHOLOGIST: CPT | Mod: 26

## 2023-12-08 RX ORDER — LORATADINE 10 MG/1
1 TABLET ORAL
Refills: 0 | DISCHARGE

## 2023-12-08 RX ORDER — ROSUVASTATIN CALCIUM 5 MG/1
1 TABLET ORAL
Refills: 0 | DISCHARGE

## 2023-12-08 RX ORDER — EMPAGLIFLOZIN 10 MG/1
1 TABLET, FILM COATED ORAL
Qty: 0 | Refills: 0 | DISCHARGE

## 2023-12-08 RX ORDER — MONTELUKAST 4 MG/1
1 TABLET, CHEWABLE ORAL
Refills: 0 | DISCHARGE

## 2023-12-08 RX ORDER — ALBUTEROL 90 UG/1
2 AEROSOL, METERED ORAL
Refills: 0 | DISCHARGE

## 2023-12-08 RX ORDER — SODIUM CHLORIDE 9 MG/ML
500 INJECTION, SOLUTION INTRAVENOUS
Refills: 0 | Status: DISCONTINUED | OUTPATIENT
Start: 2023-12-08 | End: 2023-12-22

## 2023-12-08 RX ORDER — METFORMIN HYDROCHLORIDE 850 MG/1
1 TABLET ORAL
Refills: 0 | DISCHARGE

## 2023-12-08 RX ORDER — LOSARTAN POTASSIUM 100 MG/1
1 TABLET, FILM COATED ORAL
Refills: 0 | DISCHARGE

## 2023-12-08 RX ORDER — PIOGLITAZONE HYDROCHLORIDE 15 MG/1
1 TABLET ORAL
Refills: 0 | DISCHARGE

## 2023-12-08 RX ORDER — FLUTICASONE FUROATE, UMECLIDINIUM BROMIDE AND VILANTEROL TRIFENATATE 200; 62.5; 25 UG/1; UG/1; UG/1
1 POWDER RESPIRATORY (INHALATION)
Refills: 0 | DISCHARGE

## 2023-12-08 NOTE — PRE PROCEDURE NOTE - PRE PROCEDURE EVALUATION
Attending Physician:  Dr. Sosa    Procedure: Colonoscopy    Indication for Procedure: Hx of Diverticulitis  ________________________________________________________  PAST MEDICAL & SURGICAL HISTORY:  Hyperlipidemia      Diabetes mellitus  type 2      Asthma      GERD (gastroesophageal reflux disease)      History of diverticulitis      Lung nodule  Two small solid nodules (0.5cm) are noted in the right upper and left lower lobes.      Sleep apnea      Abnormal liver enzymes      History of substance abuse      S/p nephrectomy  left, congenital defect  9y/o      S/P arthroscopy  left, torn meniscus 25yrs ago      Inguinal hernia  left      History of endoscopy  6/2019      H/O colonoscopy  6/2019      Status post colostomy      Status post Guanaco procedure      History of colostomy reversal        ALLERGIES:  Goo Jakub Spray- asthma (Other)  No Known Drug Allergies    HOME MEDICATIONS:  Albuterol (Eqv-ProAir HFA) 90 mcg/inh inhalation aerosol: 2 puff(s) inhaled every 6 hours as needed for  bronchospasm  Jardiance 25 mg oral tablet: 1 tab(s) orally once a day (in the morning)  losartan 50 mg oral tablet: 1 tab(s) orally once a day  metFORMIN 500 mg oral tablet: 1 tab(s) orally 2 times a day  montelukast 10 mg oral tablet: 1 tab(s) orally once a day  pioglitazone 30 mg oral tablet: 1 tab(s) orally once a day  rosuvastatin 5 mg oral tablet: 1 tab(s) orally once a day  Trelegy Ellipta 200 mcg-62.5 mcg-25 mcg/inh inhalation powder: 1 puff(s) inhaled once a day    AICD/PPM: [ ] yes   [ ] no    PERTINENT LAB DATA:                      PHYSICAL EXAMINATION:    Height (cm): 172.7  Weight (kg): 85.684  BMI (kg/m2): 28.7  BSA (m2): 1.99T(C): 36.3  HR: 67  BP: 136/81  RR: 17  SpO2: 94%    Constitutional: NAD  HEENT: PERRLA, EOMI,    Neck:  No JVD  Respiratory: CTAB/L  Cardiovascular: S1 and S2  Gastrointestinal: BS+, soft, NT/ND  Extremities: No peripheral edema  Neurological: A/O x 3, no focal deficits  Psychiatric: Normal mood, normal affect  Skin: No rashes    ASA Class: I [ ]  II [ ]  III [x ]  IV [ ]    COMMENTS:    The patient is a suitable candidate for the planned procedure unless box checked [ ]  No, explain:    Risks and alternatives to the procedure discussed in detail. All questions answered.

## 2023-12-13 ENCOUNTER — NON-APPOINTMENT (OUTPATIENT)
Age: 58
End: 2023-12-13

## 2023-12-13 LAB
SURGICAL PATHOLOGY STUDY: SIGNIFICANT CHANGE UP
SURGICAL PATHOLOGY STUDY: SIGNIFICANT CHANGE UP

## 2024-02-19 NOTE — DISCHARGE NOTE NURSING/CASE MANAGEMENT/SOCIAL WORK - NSDCPETBCESMAN_GEN_ALL_CORE
If you are a smoker, it is important for your health to stop smoking. Please be aware that second hand smoke is also harmful.
This was a shared visit with the RUBY. I reviewed and verified the documentation.

## 2024-04-30 NOTE — PATIENT PROFILE ADULT - OVER THE PAST TWO WEEKS, HAVE YOU FELT LITTLE INTEREST OR PLEASURE IN DOING THINGS?
patient ambulatory to ED c/o laceration to left thumb.  reports cut thumb on radiator earlier today.  last tetanus shot was last year.
no

## 2024-12-10 NOTE — H&P PST ADULT - NSICDXNOFAMILYHX_GEN_ALL_CORE
Kaitlynn Richards was seen and treated in our emergency department on 12/10/2024.                Diagnosis:     Kaitlynn  may return to work on return date.    She may return on this date: 12/11/2024         If you have any questions or concerns, please don't hesitate to call.      Catracho Boyd MD    ______________________________           _______________          _______________  Hospital Representative                              Date                                Time <-- Click to add NO pertinent Family History

## 2025-04-11 ENCOUNTER — EMERGENCY (EMERGENCY)
Facility: HOSPITAL | Age: 60
LOS: 1 days | End: 2025-04-11
Attending: EMERGENCY MEDICINE
Payer: COMMERCIAL

## 2025-04-11 VITALS
RESPIRATION RATE: 18 BRPM | DIASTOLIC BLOOD PRESSURE: 79 MMHG | HEIGHT: 67 IN | WEIGHT: 160.06 LBS | SYSTOLIC BLOOD PRESSURE: 165 MMHG | HEART RATE: 94 BPM | OXYGEN SATURATION: 96 % | TEMPERATURE: 98 F

## 2025-04-11 VITALS
HEART RATE: 69 BPM | SYSTOLIC BLOOD PRESSURE: 157 MMHG | DIASTOLIC BLOOD PRESSURE: 90 MMHG | OXYGEN SATURATION: 96 % | RESPIRATION RATE: 18 BRPM

## 2025-04-11 DIAGNOSIS — K40.90 UNILATERAL INGUINAL HERNIA, WITHOUT OBSTRUCTION OR GANGRENE, NOT SPECIFIED AS RECURRENT: Chronic | ICD-10-CM

## 2025-04-11 DIAGNOSIS — Z90.5 ACQUIRED ABSENCE OF KIDNEY: Chronic | ICD-10-CM

## 2025-04-11 DIAGNOSIS — Z98.890 OTHER SPECIFIED POSTPROCEDURAL STATES: Chronic | ICD-10-CM

## 2025-04-11 DIAGNOSIS — Z93.3 COLOSTOMY STATUS: Chronic | ICD-10-CM

## 2025-04-11 LAB
ALBUMIN SERPL ELPH-MCNC: 3.9 G/DL — SIGNIFICANT CHANGE UP (ref 3.3–5.2)
ALP SERPL-CCNC: 86 U/L — SIGNIFICANT CHANGE UP (ref 40–120)
ALT FLD-CCNC: 18 U/L — SIGNIFICANT CHANGE UP
ANION GAP SERPL CALC-SCNC: 14 MMOL/L — SIGNIFICANT CHANGE UP (ref 5–17)
APPEARANCE UR: CLEAR — SIGNIFICANT CHANGE UP
AST SERPL-CCNC: 18 U/L — SIGNIFICANT CHANGE UP
BASOPHILS # BLD AUTO: 0.08 K/UL — SIGNIFICANT CHANGE UP (ref 0–0.2)
BASOPHILS NFR BLD AUTO: 1.1 % — SIGNIFICANT CHANGE UP (ref 0–2)
BILIRUB SERPL-MCNC: 0.4 MG/DL — SIGNIFICANT CHANGE UP (ref 0.4–2)
BILIRUB UR-MCNC: NEGATIVE — SIGNIFICANT CHANGE UP
BUN SERPL-MCNC: 23.6 MG/DL — HIGH (ref 8–20)
CALCIUM SERPL-MCNC: 9.1 MG/DL — SIGNIFICANT CHANGE UP (ref 8.4–10.5)
CHLORIDE SERPL-SCNC: 99 MMOL/L — SIGNIFICANT CHANGE UP (ref 96–108)
CHOLEST SERPL-MCNC: 154 MG/DL — SIGNIFICANT CHANGE UP
CO2 SERPL-SCNC: 24 MMOL/L — SIGNIFICANT CHANGE UP (ref 22–29)
COLOR SPEC: YELLOW — SIGNIFICANT CHANGE UP
CREAT SERPL-MCNC: 0.98 MG/DL — SIGNIFICANT CHANGE UP (ref 0.5–1.3)
DIFF PNL FLD: NEGATIVE — SIGNIFICANT CHANGE UP
EGFR: 89 ML/MIN/1.73M2 — SIGNIFICANT CHANGE UP
EGFR: 89 ML/MIN/1.73M2 — SIGNIFICANT CHANGE UP
EOSINOPHIL # BLD AUTO: 0.19 K/UL — SIGNIFICANT CHANGE UP (ref 0–0.5)
EOSINOPHIL NFR BLD AUTO: 2.6 % — SIGNIFICANT CHANGE UP (ref 0–6)
GLUCOSE SERPL-MCNC: 139 MG/DL — HIGH (ref 70–99)
GLUCOSE UR QL: >=1000 MG/DL
HCT VFR BLD CALC: 47.5 % — SIGNIFICANT CHANGE UP (ref 39–50)
HDLC SERPL-MCNC: 40 MG/DL — LOW
HGB BLD-MCNC: 15.9 G/DL — SIGNIFICANT CHANGE UP (ref 13–17)
IMM GRANULOCYTES # BLD AUTO: 0.01 K/UL — SIGNIFICANT CHANGE UP (ref 0–0.07)
IMM GRANULOCYTES NFR BLD AUTO: 0.1 % — SIGNIFICANT CHANGE UP (ref 0–0.9)
KETONES UR-MCNC: NEGATIVE MG/DL — SIGNIFICANT CHANGE UP
LACTATE SERPL-SCNC: 2.5 MMOL/L — HIGH (ref 0.5–2)
LDLC SERPL-MCNC: 87 MG/DL — SIGNIFICANT CHANGE UP
LEUKOCYTE ESTERASE UR-ACNC: NEGATIVE — SIGNIFICANT CHANGE UP
LIDOCAIN IGE QN: 648 U/L — HIGH (ref 22–51)
LIPID PNL WITH DIRECT LDL SERPL: 87 MG/DL — SIGNIFICANT CHANGE UP
LYMPHOCYTES # BLD AUTO: 3.07 K/UL — SIGNIFICANT CHANGE UP (ref 1–3.3)
LYMPHOCYTES NFR BLD AUTO: 42.1 % — SIGNIFICANT CHANGE UP (ref 13–44)
MCHC RBC-ENTMCNC: 28.4 PG — SIGNIFICANT CHANGE UP (ref 27–34)
MCHC RBC-ENTMCNC: 33.5 G/DL — SIGNIFICANT CHANGE UP (ref 32–36)
MCV RBC AUTO: 84.8 FL — SIGNIFICANT CHANGE UP (ref 80–100)
MONOCYTES # BLD AUTO: 0.58 K/UL — SIGNIFICANT CHANGE UP (ref 0–0.9)
MONOCYTES NFR BLD AUTO: 8 % — SIGNIFICANT CHANGE UP (ref 2–14)
NEUTROPHILS # BLD AUTO: 3.36 K/UL — SIGNIFICANT CHANGE UP (ref 1.8–7.4)
NEUTROPHILS NFR BLD AUTO: 46.1 % — SIGNIFICANT CHANGE UP (ref 43–77)
NITRITE UR-MCNC: NEGATIVE — SIGNIFICANT CHANGE UP
NONHDLC SERPL-MCNC: 114 MG/DL — SIGNIFICANT CHANGE UP
NRBC # BLD AUTO: 0 K/UL — SIGNIFICANT CHANGE UP (ref 0–0)
NRBC # FLD: 0 K/UL — SIGNIFICANT CHANGE UP (ref 0–0)
NRBC BLD AUTO-RTO: 0 /100 WBCS — SIGNIFICANT CHANGE UP (ref 0–0)
PH UR: 6 — SIGNIFICANT CHANGE UP (ref 5–8)
PLATELET # BLD AUTO: 269 K/UL — SIGNIFICANT CHANGE UP (ref 150–400)
PMV BLD: 9.5 FL — SIGNIFICANT CHANGE UP (ref 7–13)
POTASSIUM SERPL-MCNC: 4.3 MMOL/L — SIGNIFICANT CHANGE UP (ref 3.5–5.3)
POTASSIUM SERPL-SCNC: 4.3 MMOL/L — SIGNIFICANT CHANGE UP (ref 3.5–5.3)
PROT SERPL-MCNC: 6.7 G/DL — SIGNIFICANT CHANGE UP (ref 6.6–8.7)
PROT UR-MCNC: NEGATIVE MG/DL — SIGNIFICANT CHANGE UP
RBC # BLD: 5.6 M/UL — SIGNIFICANT CHANGE UP (ref 4.2–5.8)
RBC # FLD: 13.4 % — SIGNIFICANT CHANGE UP (ref 10.3–14.5)
SODIUM SERPL-SCNC: 137 MMOL/L — SIGNIFICANT CHANGE UP (ref 135–145)
SP GR SPEC: 1.02 — SIGNIFICANT CHANGE UP (ref 1–1.03)
TRIGL SERPL-MCNC: 157 MG/DL — HIGH
TRIGL SERPL-MCNC: 160 MG/DL — HIGH
UROBILINOGEN FLD QL: 0.2 MG/DL — SIGNIFICANT CHANGE UP (ref 0.2–1)
WBC # BLD: 7.29 K/UL — SIGNIFICANT CHANGE UP (ref 3.8–10.5)
WBC # FLD AUTO: 7.29 K/UL — SIGNIFICANT CHANGE UP (ref 3.8–10.5)

## 2025-04-11 PROCEDURE — 99284 EMERGENCY DEPT VISIT MOD MDM: CPT

## 2025-04-11 PROCEDURE — 84478 ASSAY OF TRIGLYCERIDES: CPT

## 2025-04-11 PROCEDURE — 99284 EMERGENCY DEPT VISIT MOD MDM: CPT | Mod: 25

## 2025-04-11 PROCEDURE — 74176 CT ABD & PELVIS W/O CONTRAST: CPT | Mod: 26

## 2025-04-11 PROCEDURE — 81003 URINALYSIS AUTO W/O SCOPE: CPT

## 2025-04-11 PROCEDURE — 74176 CT ABD & PELVIS W/O CONTRAST: CPT | Mod: MC

## 2025-04-11 PROCEDURE — 85025 COMPLETE CBC W/AUTO DIFF WBC: CPT

## 2025-04-11 PROCEDURE — 36415 COLL VENOUS BLD VENIPUNCTURE: CPT

## 2025-04-11 PROCEDURE — 83605 ASSAY OF LACTIC ACID: CPT

## 2025-04-11 PROCEDURE — 80061 LIPID PANEL: CPT

## 2025-04-11 PROCEDURE — 80053 COMPREHEN METABOLIC PANEL: CPT

## 2025-04-11 PROCEDURE — 83690 ASSAY OF LIPASE: CPT

## 2025-04-11 RX ORDER — SODIUM CHLORIDE 9 G/1000ML
1000 INJECTION, SOLUTION INTRAVENOUS ONCE
Refills: 0 | Status: COMPLETED | OUTPATIENT
Start: 2025-04-11 | End: 2025-04-11

## 2025-04-11 RX ORDER — LIDOCAINE HYDROCHLORIDE 20 MG/ML
1 JELLY TOPICAL ONCE
Refills: 0 | Status: COMPLETED | OUTPATIENT
Start: 2025-04-11 | End: 2025-04-11

## 2025-04-11 RX ADMIN — LIDOCAINE HYDROCHLORIDE 1 PATCH: 20 JELLY TOPICAL at 18:09

## 2025-04-11 RX ADMIN — SODIUM CHLORIDE 1000 MILLILITER(S): 9 INJECTION, SOLUTION INTRAVENOUS at 18:16

## 2025-04-11 NOTE — ED ADULT TRIAGE NOTE - CHIEF COMPLAINT QUOTE
pt sent by  PCP for lipase level of 1700 pt started Ozempic 2 months ago, denies abd pain n/v/d and fevers

## 2025-04-11 NOTE — ED PROVIDER NOTE - ATTENDING APP SHARED VISIT CONTRIBUTION OF CARE
Patient seen with PA.  Here with reported lipase of 1666.  Was previously 700s.  On ozempic and just increased dose.  Patient with mild mid back pain but states that he doesn't think it's new.  no abd pain.  Non toxic.  Well appearing. No fever/chills,  No chest pain/palpitations, no SOB/cough/wheeze/stridor, No abdominal pain, No N/V/D, no dysuria/frequency/discharge, No neck pain, no rash, no changes in neurological status/function.     Gen: Alert, NAD  Head: NC, AT, PERRL, EOMI, normal lids/conjunctiva  ENT: normal hearing, patent oropharynx   Neck: +supple, no tenderness/meningismus/JVD, +Trachea midline  Pulm: Bilateral BS, normal resp effort, no wheeze/stridor/retractions  CV: RRR, no R/G, +dist pulses  Abd: soft, NT/ND, +BS, no hepatosplenomegaly  Mskel: no edema/erythema/cyanosis  Skin: no rash  Neuro: AAOx3, no gross sensory/motor deficits     Patient with repeat lipase ~650 and CT scan demonstrates no acute pathology. Patient without sx.  Has follow up with PMD in 2 days.  discussed outcome of testing and patient will discharge and follow up. Uneventful ED observation period. Non toxic.  Well appearing. Discussed signs and symptoms and reasons for return with good teachback.

## 2025-04-11 NOTE — ED PROVIDER NOTE - PATIENT PORTAL LINK FT
You can access the FollowMyHealth Patient Portal offered by [facility] by registering at the following website: [email or web address]. By joining [employer]’s FollowMyHealth portal, you will also be able to view your health information using other applications (apps) compatible with our system.

## 2025-04-11 NOTE — ED ADULT NURSE NOTE - OBJECTIVE STATEMENT
Assumed care of patient in rw. Pt a&ox4 rr even and unlabored with pmhx of DM type II presents to ed with c/o of abnormal outpatient labs. Reports had "lipase>1600". Pt reports staritng ozempic approximately 2 months ago. Denies etoh use, chest pain, sob, fever, chills, gu/gi symptoms. Pt educated on plan of care, pt able to successfully teach back plan of care to RN, RN will continue to reeducate pt during hospital stay.

## 2025-04-11 NOTE — ED PROVIDER NOTE - PHYSICAL EXAMINATION
Const: AOX3 nontoxic appearing, no apparent respiratory or physical distress. Stable gait   HEENT: Moist mucous membranes.  Eyes: MYRA. EOMI  Neck: Soft and supple. Full ROM without pain.  Cardiac: Regular rate and regular rhythm. +S1/S2. . No LE edema.  Resp: Speaking in full sentences. No evidence of respiratory distress.   Abd: Soft, non-tender, non-distended. Normal bowel sounds in all 4 quadrants. No guarding or rebound.  Back: Spine midline and non-tender. para spine TTP over lower T spine area No CVAT.  Neuro: Awake, alert & oriented x 3. Moves all extremities symmetrically.

## 2025-04-11 NOTE — ED PROVIDER NOTE - NSICDXPASTSURGICALHX_GEN_ALL_CORE_FT
PAST SURGICAL HISTORY:  H/O colonoscopy 6/2019    History of colostomy reversal     History of endoscopy 6/2019    Inguinal hernia left    S/P arthroscopy left, torn meniscus 25yrs ago    S/p nephrectomy left, congenital defect  9y/o    Status post colostomy     Status post Guanaco procedure

## 2025-04-11 NOTE — ED ADULT NURSE NOTE - SUICIDE SCREENING DEPRESSION
"  HPI   The patient is a 23-year-old male presenting with left-sided chest pain.  Symptoms began this morning at about 11:00 AM while he was at work.  He denies obvious injury.  He was not especially active when his symptoms began.  He describes on and off discomfort throughout the day.  The last episode was about an hour prior to my seeing him.  The discomfort lasts about 5 minutes and then resolves on its own without obvious cause.  He describes the discomfort as a pressure.  It is present in his left chest.  He told me the first episode began in the left upper quadrant of his abdomen and then quickly went to his left chest.  No pain currently.  Not pleuritic.  He cannot reproduce the pain with movement of his chest or arms.  He does get reflux occasionally but not on a regular basis.  He does smoke.  He drinks alcohol about 2 or 3 times a week.  No leg pain or swelling.  No cough or congestion.  Hemoptysis.  No skin rash.  He reports similar symptoms about 7 to 10 days ago.        Allergies:  Allergies   Allergen Reactions     Adhesive Tape Rash     Problem List:    Patient Active Problem List    Diagnosis Date Noted     Depression 01/07/2016     Priority: Medium      Past Medical History:    Past Medical History:   Diagnosis Date     Asperger's disorder      Depressive disorder      Generalized anxiety disorder      Past Surgical History:    No past surgical history on file.  Family History:    No family history on file.  Social History:  Marital Status:  Single [1]  Social History     Tobacco Use     Smoking status: Former Smoker     Types: Cigarettes     Smokeless tobacco: Not on file   Substance Use Topics     Alcohol use: No     Drug use: Yes      Medications:    No current outpatient medications on file.    Review of Systems   All other systems reviewed and are negative.      PE   BP: 116/75  Pulse: 76  Temp: 98.9  F (37.2  C)  Resp: 16  Height: 185.4 cm (6' 1\")  Weight: 72.6 kg (160 lb)  SpO2: 98 " %  Physical Exam  Vitals and nursing note reviewed.   Constitutional:       General: He is not in acute distress.     Comments: Thin.  Cooperative.  Mildly anxious.   HENT:      Head: Atraumatic.      Right Ear: External ear normal.      Left Ear: External ear normal.      Nose: Nose normal.      Mouth/Throat:      Mouth: Mucous membranes are moist.      Pharynx: Oropharynx is clear.   Eyes:      General: No scleral icterus.     Extraocular Movements: Extraocular movements intact.      Conjunctiva/sclera: Conjunctivae normal.      Pupils: Pupils are equal, round, and reactive to light.   Cardiovascular:      Rate and Rhythm: Normal rate.      Heart sounds: Normal heart sounds.   Pulmonary:      Effort: Pulmonary effort is normal. No respiratory distress.      Breath sounds: Normal breath sounds.   Chest:      Comments: Not tender.  Abdominal:      Comments: Soft, not tender.  Flat.  No organomegaly or mass.   Musculoskeletal:         General: Normal range of motion.      Cervical back: Normal range of motion.      Right lower leg: No tenderness. No edema.      Left lower leg: No tenderness. No edema.   Skin:     General: Skin is warm and dry.   Neurological:      Mental Status: He is alert and oriented to person, place, and time.   Psychiatric:         Behavior: Behavior normal.         ED COURSE and MDM   0010.  The patient has intermittent episodes of chest pain.  EKG documented below.  Chest x-ray pending.  Bedside ultrasound looking at his heart was performed and showed normal function without obvious pathology.    0117.  The patient has an unremarkable work-up here.  His EKG is abnormal but consistent with his age and I think it is very unlikely that there is anything severely wrong or that he is with pathology requiring emergent hospitalization or consultation.  Zio patch orders placed.  Follow-up with his primary after.    EKG  (0011)   Interpretation performed by me.  Rate: 68     Rhythm: sinus     Axis:  nl  Intervals: LA (12-2) 168, QRS (<12) 114, QTc (>5) 396  P wave: Down in V1     QRS complex: Incomplete right bundle branch block   ST segment / T-wave: Anterior ST elevation consistent with repolarization variant  Conclusion: Possible left atrial enlargement, QRS complex is showing incomplete right bundle branch block, ST elevation in the anterior leads consistent with a repolarization variant.    LABS  Labs Ordered and Resulted from Time of ED Arrival to Time of ED Departure - No data to display    IMAGING  Images reviewed by me.  Radiology report also reviewed.  XR Chest 2 Views   Final Result   IMPRESSION: Cardiomediastinal silhouette within normal limits. No focal consolidation or pleural effusion.      POC US ECHO LIMITED    (Results Pending)   Leadless EKG Monitor 3 to 7 Days    (Results Pending)       Procedures    Medications - No data to display      IMPRESSION       ICD-10-CM    1. Chest pain, unspecified type  R07.9 Leadless EKG Monitor 3 to 7 Days            Medication List      There are no discharge medications for this visit.                       Lance Acevedo MD  11/11/21 0118     Negative

## 2025-04-11 NOTE — ED PROVIDER NOTE - OBJECTIVE STATEMENT
Pt is a 60 year old male with pmhx of Type II DM and past surgical hx of Diverticulitis and nephrectomy as a child who presents after elevated lipase result. Pt reports blood work performed 4/9 morning (lipase in 700s) and blood work on 4/10 morning (lipase > 1600).  Pt  started Ozempic 2-3 months ago (Last Ozempic injection 4/9). Pt reports mild mid back pain starting 3 days ago described as throbbing. Denies hx of high triglycerides, ETOH intake, hx of gallstones, abdominal pain, N/V/D. 60 year old male with pmhx of Type II DM and past surgical hx of Diverticulitis and nephrectomy as a child, and hx of abd resection previously who sent from pcp for elevated lipase result. Pt reports blood work performed 4/9 morning (lipase in 700s) and blood work on 4/10 morning (lipase > 1600).  Pt  started Ozempic 2-3 months ago (Last Ozempic injection 4/9). Pt reports mild mid back pain starting 3 days ago described as throbbing. Denies hx of high triglycerides, ETOH intake, hx of gallstones, abdominal pain, N/V/D. denies any chest pain or shortness of breath

## 2025-04-11 NOTE — ED PROVIDER NOTE - CLINICAL SUMMARY MEDICAL DECISION MAKING FREE TEXT BOX
60 year old male with pmhx of Type II DM and past surgical hx of Diverticulitis and nephrectomy as a child, and hx of abd resection previously who sent from pcp for elevated lipase result. Pt reports blood work performed 4/9 morning (lipase in 700s) and blood work on 4/10 morning (lipase > 1600).  Pt  started Ozempic 2-3 months ago (Last Ozempic injection 4/9). Pt reports mild mid back pain starting 3 days ago described as throbbing. Denies hx of high triglycerides, ETOH intake, hx of gallstones, abdominal pain, N/V/D. denies any chest pain or shortness of breath    -Rule out pancreatitis check further labs IV fluids CT scan abdomen pelvis noncontrast status post nephrectomy likely admission

## 2025-05-01 NOTE — ASU PREOP CHECKLIST - TEMPERATURE IN FAHRENHEIT (DEGREES F)
97.2 [TextEntry] : This note was written by Fransisca José on 05/1/2025 actively solely PAVAN Fish M.D 05/1/2025 . All medical record entries made by this scribe were at my  PAVAN Fish M.D  direction and personally dictated by me on 05/1/2025. I have personally reviewed my chart and agree that the record reflects my personal performance of the history, physical exam, assessment, and plan.

## 2025-09-08 ENCOUNTER — NON-APPOINTMENT (OUTPATIENT)
Age: 60
End: 2025-09-08

## 2025-09-09 ENCOUNTER — APPOINTMENT (OUTPATIENT)
Dept: FAMILY MEDICINE | Facility: CLINIC | Age: 60
End: 2025-09-09
Payer: COMMERCIAL

## 2025-09-09 VITALS
SYSTOLIC BLOOD PRESSURE: 137 MMHG | HEART RATE: 66 BPM | WEIGHT: 177 LBS | BODY MASS INDEX: 27.72 KG/M2 | DIASTOLIC BLOOD PRESSURE: 79 MMHG

## 2025-09-09 DIAGNOSIS — K85.90 ACUTE PANCREATITIS WITHOUT NECROSIS OR INFECTION, UNSPECIFIED: ICD-10-CM

## 2025-09-09 DIAGNOSIS — Z79.899 OTHER LONG TERM (CURRENT) DRUG THERAPY: ICD-10-CM

## 2025-09-09 DIAGNOSIS — Z87.898 PERSONAL HISTORY OF OTHER SPECIFIED CONDITIONS: ICD-10-CM

## 2025-09-09 DIAGNOSIS — Z11.52 ENCOUNTER FOR SCREENING FOR COVID-19: ICD-10-CM

## 2025-09-09 DIAGNOSIS — Z01.818 ENCOUNTER FOR OTHER PREPROCEDURAL EXAMINATION: ICD-10-CM

## 2025-09-09 DIAGNOSIS — R10.33 PERIUMBILICAL PAIN: ICD-10-CM

## 2025-09-09 DIAGNOSIS — Z09 ENCOUNTER FOR FOLLOW-UP EXAMINATION AFTER COMPLETED TREATMENT FOR CONDITIONS OTHER THAN MALIGNANT NEOPLASM: ICD-10-CM

## 2025-09-09 DIAGNOSIS — R10.12 LEFT UPPER QUADRANT PAIN: ICD-10-CM

## 2025-09-09 DIAGNOSIS — Z87.19 PERSONAL HISTORY OF OTHER DISEASES OF THE DIGESTIVE SYSTEM: ICD-10-CM

## 2025-09-09 DIAGNOSIS — M25.532 PAIN IN LEFT WRIST: ICD-10-CM

## 2025-09-09 DIAGNOSIS — E11.8 TYPE 2 DIABETES MELLITUS WITH UNSPECIFIED COMPLICATIONS: ICD-10-CM

## 2025-09-09 DIAGNOSIS — L05.91 PILONIDAL CYST W/OUT ABSCESS: ICD-10-CM

## 2025-09-09 DIAGNOSIS — I10 ESSENTIAL (PRIMARY) HYPERTENSION: ICD-10-CM

## 2025-09-09 DIAGNOSIS — J30.89 OTHER ALLERGIC RHINITIS: ICD-10-CM

## 2025-09-09 DIAGNOSIS — K86.2 CYST OF PANCREAS: ICD-10-CM

## 2025-09-09 DIAGNOSIS — J44.9 CHRONIC OBSTRUCTIVE PULMONARY DISEASE, UNSPECIFIED: ICD-10-CM

## 2025-09-09 DIAGNOSIS — Z86.39 PERSONAL HISTORY OF OTHER ENDOCRINE, NUTRITIONAL AND METABOLIC DISEASE: ICD-10-CM

## 2025-09-09 DIAGNOSIS — R74.8 ABNORMAL LEVELS OF OTHER SERUM ENZYMES: ICD-10-CM

## 2025-09-09 DIAGNOSIS — M19.032 PRIMARY OSTEOARTHRITIS, LEFT WRIST: ICD-10-CM

## 2025-09-09 PROCEDURE — 99203 OFFICE O/P NEW LOW 30 MIN: CPT

## 2025-09-09 RX ORDER — CLOTRIMAZOLE AND BETAMETHASONE DIPROPIONATE 10; .5 MG/G; MG/G
1-0.05 CREAM TOPICAL
Qty: 1 | Refills: 0 | Status: ACTIVE | COMMUNITY
Start: 2025-09-09 | End: 1900-01-01

## 2025-09-09 RX ORDER — MONTELUKAST 10 MG/1
10 TABLET, FILM COATED ORAL
Qty: 90 | Refills: 0 | Status: ACTIVE | COMMUNITY
Start: 2025-09-09 | End: 1900-01-01

## 2025-09-09 RX ORDER — AMOXICILLIN AND CLAVULANATE POTASSIUM 875; 125 MG/1; MG/1
875-125 TABLET, COATED ORAL
Qty: 20 | Refills: 0 | Status: ACTIVE | COMMUNITY
Start: 2025-09-09 | End: 1900-01-01

## 2025-09-09 RX ORDER — LOSARTAN POTASSIUM 50 MG/1
50 TABLET, FILM COATED ORAL DAILY
Qty: 90 | Refills: 1 | Status: ACTIVE | COMMUNITY
Start: 2025-09-09 | End: 1900-01-01

## 2025-09-09 RX ORDER — EMPAGLIFLOZIN 25 MG/1
25 TABLET, FILM COATED ORAL DAILY
Qty: 90 | Refills: 1 | Status: ACTIVE | COMMUNITY
Start: 2025-09-09 | End: 1900-01-01

## 2025-09-18 RX ORDER — METFORMIN HYDROCHLORIDE 1000 MG/1
1000 TABLET, COATED ORAL TWICE DAILY
Qty: 180 | Refills: 1 | Status: DISCONTINUED | COMMUNITY
Start: 2025-09-09 | End: 2025-09-18

## 2025-09-18 RX ORDER — METFORMIN HYDROCHLORIDE 500 MG/1
500 TABLET, COATED ORAL
Qty: 180 | Refills: 0 | Status: ACTIVE | COMMUNITY
Start: 2025-09-18 | End: 1900-01-01

## (undated) DEVICE — BALLOON US ENDO

## (undated) DEVICE — DRSG 2X2

## (undated) DEVICE — BIOPSY FORCEP RADIAL JAW 4 STANDARD WITH NEEDLE

## (undated) DEVICE — CONTAINER FORMALIN 80ML YELLOW

## (undated) DEVICE — DRSG BANDAID 0.75X3"

## (undated) DEVICE — POLY TRAP ETRAP

## (undated) DEVICE — CATH IV SAFE BC 22G X 1" (BLUE)

## (undated) DEVICE — LINE BREATHE SAMPLNG

## (undated) DEVICE — UNDERPAD LINEN SAVER 17 X 24"

## (undated) DEVICE — LUBRICATING JELLY HR ONE SHOT 3G

## (undated) DEVICE — BIOPSY FORCEP COLD DISP

## (undated) DEVICE — GOWN LG

## (undated) DEVICE — ELCTR ECG CONDUCTIVE ADHESIVE

## (undated) DEVICE — SNARE EXACTO COLD 9MMX230CM

## (undated) DEVICE — TUBING MEDI-VAC W MAXIGRIP CONNECTORS 1/4"X6'

## (undated) DEVICE — PACK IV START WITH CHG

## (undated) DEVICE — SALIVA EJECTOR (BLUE)

## (undated) DEVICE — CLAMP BX HOT RAD JAW 3

## (undated) DEVICE — TUBING IV SET GRAVITY 3Y 100" MACRO

## (undated) DEVICE — DRSG CURITY GAUZE SPONGE 4 X 4" 12-PLY NON-STERILE

## (undated) DEVICE — BITE BLOCK ADULT 20 X 27MM (GREEN)

## (undated) DEVICE — FORCEP RADIAL JAW 4 JUMBO W NDL 2.8MM 3.2MM 240CM ORANGE DISP

## (undated) DEVICE — BASIN EMESIS 10IN GRADUATED MAUVE

## (undated) DEVICE — TUBING SUCTION NONCONDUCTIVE 6MM X 12FT

## (undated) DEVICE — ELCTR GROUNDING PAD ADULT COVIDIEN

## (undated) DEVICE — DENTURE CUP PINK